# Patient Record
Sex: FEMALE | Race: WHITE | Employment: UNEMPLOYED | URBAN - METROPOLITAN AREA
[De-identification: names, ages, dates, MRNs, and addresses within clinical notes are randomized per-mention and may not be internally consistent; named-entity substitution may affect disease eponyms.]

---

## 2017-06-02 ENCOUNTER — TELEPHONE (OUTPATIENT)
Dept: INTERNAL MEDICINE CLINIC | Age: 57
End: 2017-06-02

## 2017-06-02 ENCOUNTER — OFFICE VISIT (OUTPATIENT)
Dept: INTERNAL MEDICINE CLINIC | Age: 57
End: 2017-06-02

## 2017-06-02 VITALS
OXYGEN SATURATION: 97 % | RESPIRATION RATE: 20 BRPM | DIASTOLIC BLOOD PRESSURE: 59 MMHG | SYSTOLIC BLOOD PRESSURE: 99 MMHG | WEIGHT: 132 LBS | HEIGHT: 64 IN | HEART RATE: 63 BPM | TEMPERATURE: 98.3 F | BODY MASS INDEX: 22.53 KG/M2

## 2017-06-02 DIAGNOSIS — R51.9 INTERMITTENT HEADACHE: Primary | ICD-10-CM

## 2017-06-02 NOTE — MR AVS SNAPSHOT
Visit Information Date & Time Provider Department Dept. Phone Encounter #  
 6/2/2017  1:40 PM Nehemiah Anderson PA-C [facility] [phone] [id number] Your Appointments 6/5/2017  1:00 PM  
ACUTE CARE with Shreyas Tarango MD  
[facility]) Appt Note: Persistent, stabbing headache L side of head for 3 days [place] [address] [facility] [place] [address] Upcoming Health Maintenance Date Due INFLUENZA AGE 9 TO ADULT 8/1/2017 COLONOSCOPY 1/1/2018 PAP AKA CERVICAL CYTOLOGY 10/19/2018 BREAST CANCER SCRN MAMMOGRAM 11/3/2018 DTaP/Tdap/Td series (2 - Td) 8/24/2020 Allergies as of 6/2/2017  Review Complete On: 6/2/2017 By: Micheal Dash LPN Severity Noted Reaction Type Reactions Codeine  06/20/2011   Intolerance Itching Current Immunizations  Never Reviewed Name Date Influenza Vaccine 10/16/2014 Influenza Vaccine (Quad) PF 10/11/2016 Tdap 8/24/2010 Not reviewed this visit Vitals BP Pulse Temp Resp Height(growth percentile) Weight(growth percentile) 99/59 (BP 1 Location: Left arm, BP Patient Position: Sitting) 63 98.3 °F (36.8 °C) (Oral) 20 5' 4\" (1.626 m) 132 lb (59.9 kg) SpO2 BMI OB Status Smoking Status 97% 22.66 kg/m2 Ablation Never Smoker Vitals History BMI and BSA Data Body Mass Index Body Surface Area  
 22.66 kg/m 2 1.64 m 2 Preferred Pharmacy Pharmacy Name Phone [address] [address] [phone] Your Updated Medication List  
  
   
This list is accurate as of: 6/2/17  2:38 PM.  Always use your most recent med list.  
  
  
  
  
 CALCIUM+D 500 mg(1,250mg) -200 unit per tablet Generic drug:  calcium-vitamin D Take 1 Tab by mouth two (2) times daily (with meals). valACYclovir 500 mg tablet Commonly known as:  VALTREX Take 1 Tab by mouth two (2) times a day. For 5 days prn outbreak Introducing hospitals & HEALTH SERVICES! Dear Tyler: 
Thank you for requesting a [employer] account. Our records indicate that you already have an active [employer] account. You can access your account anytime at [email or web address] Did you know that you can access your hospital and ER discharge instructions at any time in [employer]? You can also review all of your test results from your hospital stay or ER visit. Additional Information If you have questions, please visit the Frequently Asked Questions section of the [employer] website at [email or web address] Remember, [employer] is NOT to be used for urgent needs. For medical emergencies, dial 911. Now available from your iPhone and Android! Please provide this summary of care documentation to your next provider. Your primary care clinician is listed as YOSELIN DIAZ. If you have any questions after today's visit, please call [phone].

## 2017-06-02 NOTE — PROGRESS NOTES
Chief Complaint   Patient presents with    Headache    Visual Problems     Patient reports she had some floating sparkles in her visual field she went to her eye doctor for follow up. Eye doctor checked to make sure her retina was not detached. She remains  With HA'S and some nausea.

## 2017-06-03 NOTE — PROGRESS NOTES
HISTORY OF PRESENT ILLNESS  Ce Flores is a 61 y.o. female. HPI  Patient presents to the office for evaluation of stabbing head pain. She reports on May 20 th she started to see larger floaters and streaks of lights. It was around this time she also developed a sharp stabbing pain in the left side of her head. She reports the pain does not last very long but she does have some what of a residual discomfort. She went to see her eye doctor and he reports he could see the floater but told her the retinal was normal. He then had her to come back on the 27 th for recheck. Again she was told her floater was stable and retina normal. She has continued to have the pain. She has taken ASA, tylenol , and motrin with no relief. She does admit the pain does not seem to be coming as frequent. She can recall having a similar episode like this 10-11 years ago. She does not recall the diagnosis or the treatment. Review of Systems   Eyes: Negative for blurred vision, double vision, photophobia and pain. Gastrointestinal: Negative for nausea and vomiting. Some mild nausea due to the ASA   Neurological: Positive for headaches. Negative for dizziness and sensory change. Blood pressure 99/59, pulse 63, temperature 98.3 °F (36.8 °C), temperature source Oral, resp. rate 20, height 5' 4\" (1.626 m), weight 132 lb (59.9 kg), SpO2 97 %. Physical Exam   Constitutional: She appears well-developed and well-nourished. No distress. HENT:   No tenderness of the scalp. No rash noted. Eyes: Pupils are equal, round, and reactive to light. Neurological: She has normal reflexes. ASSESSMENT and PLAN  Wood Oneill was seen today for headache and visual problems. Diagnoses and all orders for this visit:    Intermittent headache    Dr. Samm Silverman was able to see the patient as well. The patient was told that her pain seemed to be a nerve pain of some kind.  She is gong to take mobic over the weekend and then report back to Dr. Ruff Freed on Monday if not feeling better. She will get a CT if pain is not better.

## 2017-10-23 ENCOUNTER — CLINICAL SUPPORT (OUTPATIENT)
Dept: INTERNAL MEDICINE CLINIC | Age: 57
End: 2017-10-23

## 2017-10-23 VITALS
TEMPERATURE: 97.7 F | DIASTOLIC BLOOD PRESSURE: 54 MMHG | SYSTOLIC BLOOD PRESSURE: 91 MMHG | OXYGEN SATURATION: 97 % | HEART RATE: 59 BPM | RESPIRATION RATE: 20 BRPM

## 2017-10-23 DIAGNOSIS — Z23 ENCOUNTER FOR IMMUNIZATION: Primary | ICD-10-CM

## 2018-02-13 ENCOUNTER — OFFICE VISIT (OUTPATIENT)
Dept: INTERNAL MEDICINE CLINIC | Age: 58
End: 2018-02-13

## 2018-02-13 VITALS
RESPIRATION RATE: 17 BRPM | SYSTOLIC BLOOD PRESSURE: 101 MMHG | TEMPERATURE: 97.5 F | DIASTOLIC BLOOD PRESSURE: 47 MMHG | HEART RATE: 57 BPM | WEIGHT: 134.8 LBS | HEIGHT: 64 IN | BODY MASS INDEX: 23.01 KG/M2 | OXYGEN SATURATION: 98 %

## 2018-02-13 DIAGNOSIS — Z12.39 SCREENING FOR BREAST CANCER: ICD-10-CM

## 2018-02-13 DIAGNOSIS — Z00.00 ROUTINE GENERAL MEDICAL EXAMINATION AT A HEALTH CARE FACILITY: Primary | ICD-10-CM

## 2018-02-13 RX ORDER — VALACYCLOVIR HYDROCHLORIDE 1 G/1
2000 TABLET, FILM COATED ORAL
COMMUNITY
Start: 2017-12-14 | End: 2022-02-08 | Stop reason: SDUPTHER

## 2018-02-13 RX ORDER — AZITHROMYCIN 250 MG/1
TABLET, FILM COATED ORAL
Qty: 6 TAB | Refills: 0 | Status: SHIPPED | OUTPATIENT
Start: 2018-02-13 | End: 2018-02-18

## 2018-02-13 NOTE — MR AVS SNAPSHOT
[address] [place] [address] [address] [place] [address] 
[phone] Patient: Freedom Marlow MRN: [id number] Visit Information Date & Time Provider Department Dept. Phone Encounter #  
 2/13/2018 10:40 AM Jese Durbin MD [facility] [phone] [id number] Upcoming Health Maintenance Date Due COLONOSCOPY 1/1/2018 PAP AKA CERVICAL CYTOLOGY 10/19/2018 BREAST CANCER SCRN MAMMOGRAM 11/3/2018 DTaP/Tdap/Td series (2 - Td) 8/24/2020 Allergies as of 2/13/2018  Review Complete On: 2/13/2018 By: Jese Durbin MD  
  
 Severity Noted Reaction Type Reactions Codeine  06/20/2011   Intolerance Itching Current Immunizations  Reviewed on 10/23/2017 Name Date Influenza Vaccine 10/16/2014 Influenza Vaccine (Quad) PF 10/23/2017, 10/11/2016 Tdap 8/24/2010 Not reviewed this visit You Were Diagnosed With   
  
 Codes Comments Routine general medical examination at a health care facility    -  Primary ICD-10-CM: Z00.00 ICD-9-CM: V70.0 Screening for breast cancer     ICD-10-CM: Z12.31 
ICD-9-CM: V76.10 Vitals BP Pulse Temp Resp Height(growth percentile) Weight(growth percentile) 101/47 (BP 1 Location: Left arm, BP Patient Position: Sitting) (!) 57 97.5 °F (36.4 °C) (Oral) 17 5' 4\" (1.626 m) 134 lb 12.8 oz (61.1 kg) SpO2 BMI OB Status Smoking Status 98% 23.14 kg/m2 Ablation Never Smoker Vitals History BMI and BSA Data Body Mass Index Body Surface Area  
 23.14 kg/m 2 1.66 m 2 Preferred Pharmacy Pharmacy Name Phone [facility]  [phone] Your Updated Medication List  
  
   
This list is accurate as of: 2/13/18 11:37 AM.  Always use your most recent med list.  
  
  
  
  
 azithromycin 250 mg tablet Commonly known as:  Marcella Galdamez Take as directed. CALCIUM+D 500 mg(1,250mg) -200 unit per tablet Generic drug:  calcium-vitamin D Take 1 Tab by mouth two (2) times daily (with meals). valACYclovir 1 gram tablet Commonly known as:  VALTREX Take 2,000 mg by mouth every twelve (12) hours. X 2 doses Prescriptions Sent to Pharmacy Refills  
 azithromycin (ZITHROMAX) 250 mg tablet 0 Sig: Take as directed. Class: Normal  
 Pharmacy: [address] [place] [address] 18, [address] #: [phone] We Performed the Following CBC W/O DIFF [01740 CPT(R)] LIPID PANEL [13915 CPT(R)] METABOLIC PANEL, COMPREHENSIVE [66267 CPT(R)] TSH 3RD GENERATION [83677 CPT(R)] VITAMIN D, 25 HYDROXY [id number] CPT(R)] To-Do List   
 02/20/2018 Imaging:  [facility] MAMMO BI SCREENING INCL CAD Patient Instructions Stop Calcium and Vitamin D.  Start Vitamin D3 1000 units daily. Introducing [facility] & HEALTH SERVICES! Dear Jasper Brown: 
Thank you for requesting a [employer] account. Our records indicate that you already have an active [employer] account. You can access your account anytime at [email or web address]"[email or web address]. [facility]/"[employer] Did you know that you can access your hospital and ER discharge instructions at any time in [employer]? You can also review all of your test results from your hospital stay or ER visit. Additional Information If you have questions, please visit the Frequently Asked Questions section of the [employer] website at [email or web address]"[email or web address]. [facility]/"[employer]/. Remember, [employer] is NOT to be used for urgent needs. For medical emergencies, dial 911. Now available from your iPhone and Android! Please provide this summary of care documentation to your next provider. Your primary care clinician is listed as YOSELIN DIAZ. If you have any questions after today's visit, please call [phone].

## 2018-02-13 NOTE — PROGRESS NOTES
Chief Complaint   Patient presents with    Complete Physical     1. Have you been to the ER, urgent care clinic since your last visit? Hospitalized since your last visit? No    2. Have you seen or consulted any other health care providers outside of the [address] [place] [address] since your last visit? Include any pap smears or colon screening.  No

## 2018-02-13 NOTE — PROGRESS NOTES
Subjective:   60 y.o. female for Well Woman Check. Her gyne and breast care is done elsewhere by her Ob-Gyne physician. Patient Active Problem List    Diagnosis Date Noted    Aisha left 10/11/2016    Parotid neoplasm 08/29/2011     Current Outpatient Prescriptions   Medication Sig Dispense Refill    valACYclovir (VALTREX) 1 gram tablet Take 2,000 mg by mouth every twelve (12) hours. X 2 doses      calcium-vitamin D (CALCIUM+D) 500 mg(1,250mg) -200 unit per tablet Take 1 Tab by mouth two (2) times daily (with meals). Allergies   Allergen Reactions    Codeine Itching     Past Medical History:   Diagnosis Date    Arthritis     Nausea & vomiting      Past Surgical History:   Procedure Laterality Date    BREAST SURGERY PROCEDURE UNLISTED  1996    IMPLANTS    ENDOSCOPY, COLON, DIAGNOSTIC  5/2009    Eleanor Young)    HX DILATION AND CURETTAGE  1993    HX GYN  2007    ABLATION (+bladder sling)    HX HEENT  1993    SINUS    HX HEENT  2005    LASIK    HX ORTHOPAEDIC  2004    R HIP    IMPLANT BREAST SILICONE/EQ  3351     Family History   Problem Relation Age of Onset    Cancer Mother     Arthritis-rheumatoid Mother     Lung Disease Mother      pulm fibrosis    Macular Degen Father     Lung Disease Maternal Uncle      pulm fibrosis    Lung Disease Maternal Grandmother      pulm fibrosis     Social History   Substance Use Topics    Smoking status: Never Smoker    Smokeless tobacco: Never Used    Alcohol use 1.2 - 1.8 oz/week     2 - 3 Standard drinks or equivalent per week      Comment: OCCASIONAL         Specific concerns today:   Keeps receiving calls re colonoscopy. Had colonoscopy in 5/2009 with Dr. Moira Gifford. Has valtrex to take 2000mg q12h x 2 doses for cold sores. Recently had 1, took medication and not as bad. Hadn't had a cold sore in a while. 2 weeks ago started with headache and congestion. Increased cold symptoms.   Continues with congestion and cough  Congestion is colored in the am.  No fevers and chills. Initially taking Tylenol and Amelia Boulder cold and cough. Also with Netti pot at least daily. .    In 2013 participated in study at [facility] for pulmonary fibrosis ([facility] Mother, Lyndsey and Negro). Had PFTs, lab work, CT of lungs and lung biopsy. Just had f/u with time 1/2018 - had lab work, PFT's and lung CT repeated. Told no change    Review of Systems  Constitutional: negative  Eyes: negative except for benign floaters. UTD with Osvaldo Adan at [facility]. Ears, nose, mouth, throat, and face: negative except for decreased hearing on right post parotid surgery. Respiratory: negative except for cough with cold  Cardiovascular: negative  Gastrointestinal: negative  Genitourinary:negative  Integument/breast: negative  Hematologic/lymphatic: negative  Musculoskeletal:negative except for mild low back pain - improved by short term PT and home exercises. left sciatica  Neurological: negative  Behavioral/Psych: negative  Endocrine: negative  Allergic/Immunologic: negative    Objective:   Blood pressure 101/47, pulse (!) 57, temperature 97.5 °F (36.4 °C), temperature source Oral, resp. rate 17, height 5' 4\" (1.626 m), weight 134 lb 12.8 oz (61.1 kg), SpO2 98 %.    Physical Examination:   General appearance - alert, well appearing, and in no distress and oriented to person, place, and time  Mental status - alert, oriented to person, place, and time, normal mood, behavior, speech, dress, motor activity, and thought processes  Eyes - pupils equal and reactive, extraocular eye movements intact  Ears - bilateral external ear canals normal, TM fullness without erythema bilat  Nose - edematous with thick discharge,  Tenderness right frontal sinus  Mouth - mucous membranes moist, pharynx normal without lesions  Neck - supple, no significant adenopathy, carotids upstroke normal bilaterally, no bruits, thyroid exam: thyroid is normal in size without nodules or tenderness  Lymphatics - no palpable lymphadenopathy, no hepatosplenomegaly  Chest - clear to auscultation, no wheezes, rales or rhonchi, symmetric air entry  Heart - normal rate, regular rhythm, normal S1, S2, no murmurs, rubs, clicks or gallops  Abdomen - soft, nontender, nondistended, no masses or organomegaly  bowel sounds normal  Breasts - breasts appear normal, no suspicious masses, no skin or nipple changes or axillary nodes  Back exam - full range of motion, no tenderness, palpable spasm or pain on motion  Neurological - alert, oriented, normal speech, no focal findings or movement disorder noted  Musculoskeletal - no joint tenderness, deformity or swelling  Extremities - peripheral pulses normal, no pedal edema, no clubbing or cyanosis  Skin - normal coloration and turgor, no rashes, no suspicious skin lesions noted     Assessment/Plan:   61 yo female. - check labs, mammogram, will obtain record of last colonoscopy but looks like she is not due until 2019. Sinusitis - zpack, mucinex.     Orders Placed This Encounter    KIRSTEN MAMMO BI SCREENING INCL CAD    METABOLIC PANEL, COMPREHENSIVE    LIPID PANEL    CBC W/O DIFF    VITAMIN D, 25 HYDROXY    TSH 3RD GENERATION    valACYclovir (VALTREX) 1 gram tablet    azithromycin (ZITHROMAX) 250 mg tablet     Follow-up Disposition: Not on File

## 2018-02-14 LAB
25(OH)D3+25(OH)D2 SERPL-MCNC: 37.2 NG/ML (ref 30–100)
ALBUMIN SERPL-MCNC: 4.4 G/DL (ref 3.5–5.5)
ALBUMIN/GLOB SERPL: 1.6 {RATIO} (ref 1.2–2.2)
ALP SERPL-CCNC: 95 IU/L (ref 39–117)
ALT SERPL-CCNC: 16 IU/L (ref 0–32)
AST SERPL-CCNC: 23 IU/L (ref 0–40)
BILIRUB SERPL-MCNC: 0.3 MG/DL (ref 0–1.2)
BUN SERPL-MCNC: 19 MG/DL (ref 6–24)
BUN/CREAT SERPL: 32 (ref 9–23)
CALCIUM SERPL-MCNC: 9.6 MG/DL (ref 8.7–10.2)
CHLORIDE SERPL-SCNC: 98 MMOL/L (ref 96–106)
CHOLEST SERPL-MCNC: 201 MG/DL (ref 100–199)
CO2 SERPL-SCNC: 28 MMOL/L (ref 18–29)
CREAT SERPL-MCNC: 0.6 MG/DL (ref 0.57–1)
ERYTHROCYTE [DISTWIDTH] IN BLOOD BY AUTOMATED COUNT: 13.1 % (ref 12.3–15.4)
GFR SERPLBLD CREATININE-BSD FMLA CKD-EPI: 100 ML/MIN/1.73
GFR SERPLBLD CREATININE-BSD FMLA CKD-EPI: 115 ML/MIN/1.73
GLOBULIN SER CALC-MCNC: 2.7 G/DL (ref 1.5–4.5)
GLUCOSE SERPL-MCNC: 83 MG/DL (ref 65–99)
HCT VFR BLD AUTO: 40.8 % (ref 34–46.6)
HDLC SERPL-MCNC: 101 MG/DL
HGB BLD-MCNC: 13.2 G/DL (ref 11.1–15.9)
INTERPRETATION, 910389: NORMAL
LDLC SERPL CALC-MCNC: 88 MG/DL (ref 0–99)
MCH RBC QN AUTO: 31.7 PG (ref 26.6–33)
MCHC RBC AUTO-ENTMCNC: 32.4 G/DL (ref 31.5–35.7)
MCV RBC AUTO: 98 FL (ref 79–97)
PLATELET # BLD AUTO: 247 X10E3/UL (ref 150–379)
POTASSIUM SERPL-SCNC: 4.1 MMOL/L (ref 3.5–5.2)
PROT SERPL-MCNC: 7.1 G/DL (ref 6–8.5)
RBC # BLD AUTO: 4.16 X10E6/UL (ref 3.77–5.28)
SODIUM SERPL-SCNC: 142 MMOL/L (ref 134–144)
TRIGL SERPL-MCNC: 59 MG/DL (ref 0–149)
TSH SERPL DL<=0.005 MIU/L-ACNC: 1.31 UIU/ML (ref 0.45–4.5)
VLDLC SERPL CALC-MCNC: 12 MG/DL (ref 5–40)
WBC # BLD AUTO: 8.5 X10E3/UL (ref 3.4–10.8)

## 2018-02-16 ENCOUNTER — HOSPITAL ENCOUNTER (OUTPATIENT)
Dept: MAMMOGRAPHY | Age: 58
Discharge: HOME | End: 2018-02-16
Attending: INTERNAL MEDICINE
Payer: COMMERCIAL

## 2018-02-16 DIAGNOSIS — Z12.39 SCREENING FOR BREAST CANCER: ICD-10-CM

## 2018-02-16 PROCEDURE — 77067 SCR MAMMO BI INCL CAD: CPT

## 2018-10-19 ENCOUNTER — CLINICAL SUPPORT (OUTPATIENT)
Dept: INTERNAL MEDICINE CLINIC | Age: 58
End: 2018-10-19

## 2018-10-19 VITALS — TEMPERATURE: 98.1 F

## 2018-10-19 DIAGNOSIS — Z23 ENCOUNTER FOR IMMUNIZATION: Primary | ICD-10-CM

## 2018-12-04 ENCOUNTER — OFFICE VISIT (OUTPATIENT)
Dept: INTERNAL MEDICINE CLINIC | Age: 58
End: 2018-12-04

## 2018-12-04 VITALS
SYSTOLIC BLOOD PRESSURE: 99 MMHG | HEART RATE: 62 BPM | HEIGHT: 64 IN | DIASTOLIC BLOOD PRESSURE: 53 MMHG | OXYGEN SATURATION: 98 % | RESPIRATION RATE: 20 BRPM | TEMPERATURE: 98.1 F | WEIGHT: 134 LBS | BODY MASS INDEX: 22.88 KG/M2

## 2018-12-04 DIAGNOSIS — K05.6 SORE GUMS: Primary | ICD-10-CM

## 2018-12-04 DIAGNOSIS — J02.9 SORE THROAT: ICD-10-CM

## 2018-12-04 LAB
S PYO AG THROAT QL: NEGATIVE
VALID INTERNAL CONTROL?: YES

## 2018-12-04 RX ORDER — TRIAMCINOLONE ACETONIDE 1 MG/G
PASTE DENTAL 2 TIMES DAILY
Qty: 5 G | Refills: 0 | Status: SHIPPED | OUTPATIENT
Start: 2018-12-04 | End: 2019-02-22 | Stop reason: ALTCHOICE

## 2018-12-04 NOTE — PROGRESS NOTES
HISTORY OF PRESENT ILLNESS  Serena Gonzales is a 60 y.o. female. HPI  Patient presents to the office for evaluation of mouth pain. She reports she was diagnosed with Herpes simplex 1 about 3 years. She states the first outbreak was severe and she was treated initially with a longer course of valtrex. Since then she has episodes from time to time. For the past year however she has been symptoms ranging for soreness of gums to pain of her tongue. She is not sure if the pain is related to her diagnosis of oral herpes or not. She has been getting her teeth straighten with invisaline as well. She can recall when she first used the retainer it did make a tingling sensation in her mouth. Also she does believe it may run her gums at times. She states she has been using the same types of  on the retainers since the start of her using them. She does have a sore throat that worsen last night and states she has been having a headache off and on for some time. Review of Systems   HENT:        Gum and tongue pain. Blood pressure 99/53, pulse 62, temperature 98.1 °F (36.7 °C), temperature source Oral, resp. rate 20, height 5' 4\" (1.626 m), weight 134 lb (60.8 kg), SpO2 98 %. Physical Exam   HENT:   Mouth- mild irritation noted of the upper gum area  Mild erythema noted of the front aspect of the tip  No vesicles noted in mouth or on the lip. ASSESSMENT and PLAN  Diagnoses and all orders for this visit:    1. Sore gums  -     triamcinolone acetonide (KENALOG) 0.1 % dental paste; by Dental route two (2) times a day. -     AMB POC RAPID STREP A  -     CULTURE, STREP THROAT    2. Sore throat       I explained to the patient that I am not convinced that the soreness in her mouth is related to her history of cold sores. I believe the soreness is related to her retainer and I have ask then to reach out to them to see if previous patients have complained about this before.  I will speak with Dr. Channing Spatz about her symptoms and get her thoughts. Meanwhile she is going to try the kenalog to see if this will help. All this was discussed with the patient and she understands and agrees.

## 2018-12-06 LAB — S PYO THROAT QL CULT: NEGATIVE

## 2018-12-07 NOTE — PROGRESS NOTES
Writer contacted patient to inform of throat cx results per Mac Flash, patient verbalized understanding and informed writer she saw her orthodontist and he said the cleaning tabs do have a know allergen along with long term use the retainer is now toxic, a new retainer is being made and a alternative to cleaning may be in order, patient states she now has a cold so she may have had a couple of things going on.

## 2019-02-22 ENCOUNTER — OFFICE VISIT (OUTPATIENT)
Dept: INTERNAL MEDICINE CLINIC | Age: 59
End: 2019-02-22

## 2019-02-22 VITALS
BODY MASS INDEX: 23.32 KG/M2 | HEIGHT: 64 IN | HEART RATE: 47 BPM | TEMPERATURE: 98 F | WEIGHT: 136.6 LBS | OXYGEN SATURATION: 98 % | SYSTOLIC BLOOD PRESSURE: 95 MMHG | DIASTOLIC BLOOD PRESSURE: 51 MMHG

## 2019-02-22 DIAGNOSIS — Z78.0 POSTMENOPAUSAL: ICD-10-CM

## 2019-02-22 DIAGNOSIS — Z00.00 ROUTINE GENERAL MEDICAL EXAMINATION AT A HEALTH CARE FACILITY: Primary | ICD-10-CM

## 2019-02-22 DIAGNOSIS — Z23 ENCOUNTER FOR IMMUNIZATION: ICD-10-CM

## 2019-02-22 DIAGNOSIS — M85.89 OSTEOPENIA OF MULTIPLE SITES: ICD-10-CM

## 2019-02-22 NOTE — PROGRESS NOTES
Subjective:   60 y.o. female for Well Woman Check. Her gyne and breast care is done elsewhere by her Ob-Gyne physician. Had colonoscopy last Monday 2/18 with Dr Marylen Crofts. Found 1 tiny polyp  Has mammogram next month. Pap in fall. Patient Active Problem List    Diagnosis Date Noted    Bunion, left 10/11/2016    Parotid neoplasm 08/29/2011     Current Outpatient Medications   Medication Sig Dispense Refill    vit C/multivit-mins/calcium/D3 (EMERGEN-C VITAMIN D-CALCIUM PO) Take  by mouth.  valACYclovir (VALTREX) 1 gram tablet Take 2,000 mg by mouth every twelve (12) hours. X 2 doses       Allergies   Allergen Reactions    Codeine Itching     Past Medical History:   Diagnosis Date    Arthritis     Nausea & vomiting      Past Surgical History:   Procedure Laterality Date    BREAST SURGERY PROCEDURE UNLISTED  1996    IMPLANTS    ENDOSCOPY, COLON, DIAGNOSTIC  5/2009    Patrizia Levin)    HX DILATION AND CURETTAGE  1993    HX GYN  2007    ABLATION (+bladder sling)    HX HEENT  1993    SINUS    HX HEENT  2005    LASIK    HX ORTHOPAEDIC  2004    R HIP    IMPLANT BREAST SILICONE/EQ  3882     Family History   Problem Relation Age of Onset    Cancer Mother     Arthritis-rheumatoid Mother     Lung Disease Mother         pulm fibrosis    Macular Degen Father     Lung Disease Maternal Uncle         pulm fibrosis    Lung Disease Maternal Grandmother         pulm fibrosis     Social History     Tobacco Use    Smoking status: Never Smoker    Smokeless tobacco: Never Used   Substance Use Topics    Alcohol use: Yes     Alcohol/week: 1.2 - 1.8 oz     Types: 2 - 3 Standard drinks or equivalent per week     Comment: OCCASIONAL             Specific concerns today:   Saw FERNANDA Garber in December for ? Allergic reaction to her Invisalign vs . Changed , saw dentist who made her a new Invisalign out of different material.  Since this time mouth still doesn't feel right. Continues to itch.   She has seen her dentist who brought up burning mouth syndrome but she feels more irritated or itching. She also saw her dermatologist who did a patch test which was negative. When she tries to wear the Invisalign cant wear more than 2-3 hours without mouth irritation. Has an upcoming appointment with Dr. Can Wong whom her dermatologist referred her to re allergic reaction. She has a hx of oral HSV but has not had any blistering. Mother with similar issues - can only use 1 toothpaste. She has changed to a hypoallergenic toothpaste without improvement. Wonders about different flavorings. Weight is up a couple of lbs. Continues to exercise. Tries to eat well. Still likes sweets. Review of Systems  Constitutional: negative except for mild weight gain of about 2 lbs per year  Eyes: negative  Ears, nose, mouth, throat, and face: negative except for mouth irritation as above.  right ear congestion intermittently. Respiratory: negative except for cough with colds  Cardiovascular: negative  Gastrointestinal: negative  Genitourinary:negative except for mild urge incontinence  Integument/breast: negative  Hematologic/lymphatic: negative  Musculoskeletal:negative  Neurological: negative  Behavioral/Psych: negative  Endocrine: negative  Allergic/Immunologic: negative    Objective:   Blood pressure 95/51, pulse (!) 47, temperature 98 °F (36.7 °C), temperature source Oral, height 5' 4\" (1.626 m), weight 136 lb 9.6 oz (62 kg), SpO2 98 %.    Physical Examination:   General appearance - alert, well appearing, and in no distress and oriented to person, place, and time  Mental status - alert, oriented to person, place, and time, normal mood, behavior, speech, dress, motor activity, and thought processes  Eyes - pupils equal and reactive, extraocular eye movements intact  Ears - bilateral TM's and external ear canals normal  Nose - normal and patent, no erythema, discharge or polyps  Mouth - mucous membranes moist, pharynx normal without lesions  Neck - supple, no significant adenopathy, carotids upstroke normal bilaterally, no bruits, thyroid exam: thyroid is normal in size without nodules or tenderness  Lymphatics - no palpable lymphadenopathy, no hepatosplenomegaly  Chest - clear to auscultation, no wheezes, rales or rhonchi, symmetric air entry  Heart - normal rate, regular rhythm, normal S1, S2, no murmurs, rubs, clicks or gallops  Abdomen - soft, nontender, nondistended, no masses or organomegaly  bowel sounds normal  Breasts - breasts appear normal - bilat implants, no suspicious masses, no skin or nipple changes or axillary nodes  Back exam - full range of motion, no tenderness, palpable spasm or pain on motion  Neurological - alert, oriented, normal speech, no focal findings or movement disorder noted  Musculoskeletal - no joint tenderness, deformity or swelling  Extremities - peripheral pulses normal, no pedal edema, no clubbing or cyanosis  Skin - normal coloration and turgor, no rashes, no suspicious skin lesions noted     Assessment/Plan:   64yo female  Mouth irritation - f/u with Dr. Jamel Maciel for further patch testing. ? 1-2 months suppressive valacyclovir, ? Burning mouth. Osteopenia on bone density 2015. Will repeat  Hm - TDAP, upcoming mammogram.   Check labs.     call if any problems  Orders Placed This Encounter    DEXA BONE DENSITY STUDY AXIAL    TETANUS, DIPHTHERIA TOXOIDS AND ACELLULAR PERTUSSIS VACCINE (TDAP), IN INDIVIDS. >=7, IM    METABOLIC PANEL, COMPREHENSIVE    LIPID PANEL    CBC W/O DIFF    VITAMIN D, 25 HYDROXY    TSH 3RD GENERATION    vit C/multivit-mins/calcium/D3 (EMERGEN-C VITAMIN D-CALCIUM PO)    varicella-zoster recombinant, PF, (SHINGRIX, PF,) 50 mcg/0.5 mL susr injection     Follow-up Disposition: Not on File

## 2019-02-22 NOTE — PROGRESS NOTES
Visit Vitals  BP 95/51   Pulse (!) 47   Temp 98 °F (36.7 °C) (Oral)   Ht 5' 4\" (1.626 m)   Wt 136 lb 9.6 oz (62 kg)   SpO2 98%   BMI 23.45 kg/m²     1. Have you been to the ER, urgent care clinic since your last visit? Hospitalized since your last visit? no    2. Have you seen or consulted any other health care providers outside of the [address] [place] [address] since your last visit? Include any pap smears or colon screening.  no

## 2019-02-23 LAB
25(OH)D3+25(OH)D2 SERPL-MCNC: 36.2 NG/ML (ref 30–100)
ALBUMIN SERPL-MCNC: 4.5 G/DL (ref 3.6–4.8)
ALBUMIN/GLOB SERPL: 1.6 {RATIO} (ref 1.2–2.2)
ALP SERPL-CCNC: 87 IU/L (ref 39–117)
ALT SERPL-CCNC: 20 IU/L (ref 0–32)
AST SERPL-CCNC: 25 IU/L (ref 0–40)
BILIRUB SERPL-MCNC: 0.3 MG/DL (ref 0–1.2)
BUN SERPL-MCNC: 17 MG/DL (ref 8–27)
BUN/CREAT SERPL: 22 (ref 12–28)
CALCIUM SERPL-MCNC: 9.3 MG/DL (ref 8.7–10.3)
CHLORIDE SERPL-SCNC: 102 MMOL/L (ref 96–106)
CHOLEST SERPL-MCNC: 218 MG/DL (ref 100–199)
CO2 SERPL-SCNC: 22 MMOL/L (ref 20–29)
CREAT SERPL-MCNC: 0.79 MG/DL (ref 0.57–1)
ERYTHROCYTE [DISTWIDTH] IN BLOOD BY AUTOMATED COUNT: 13.1 % (ref 12.3–15.4)
GLOBULIN SER CALC-MCNC: 2.9 G/DL (ref 1.5–4.5)
GLUCOSE SERPL-MCNC: 95 MG/DL (ref 65–99)
HCT VFR BLD AUTO: 41.6 % (ref 34–46.6)
HDLC SERPL-MCNC: 99 MG/DL
HGB BLD-MCNC: 13.4 G/DL (ref 11.1–15.9)
INTERPRETATION, 910389: NORMAL
LDLC SERPL CALC-MCNC: 105 MG/DL (ref 0–99)
MCH RBC QN AUTO: 32 PG (ref 26.6–33)
MCHC RBC AUTO-ENTMCNC: 32.2 G/DL (ref 31.5–35.7)
MCV RBC AUTO: 99 FL (ref 79–97)
PLATELET # BLD AUTO: 206 X10E3/UL (ref 150–379)
POTASSIUM SERPL-SCNC: 4.5 MMOL/L (ref 3.5–5.2)
PROT SERPL-MCNC: 7.4 G/DL (ref 6–8.5)
RBC # BLD AUTO: 4.19 X10E6/UL (ref 3.77–5.28)
SODIUM SERPL-SCNC: 142 MMOL/L (ref 134–144)
TRIGL SERPL-MCNC: 68 MG/DL (ref 0–149)
TSH SERPL DL<=0.005 MIU/L-ACNC: 1.9 UIU/ML (ref 0.45–4.5)
VLDLC SERPL CALC-MCNC: 14 MG/DL (ref 5–40)
WBC # BLD AUTO: 4.6 X10E3/UL (ref 3.4–10.8)

## 2019-03-12 ENCOUNTER — HOSPITAL ENCOUNTER (OUTPATIENT)
Dept: MAMMOGRAPHY | Age: 59
Discharge: HOME | End: 2019-03-12
Attending: INTERNAL MEDICINE
Payer: COMMERCIAL

## 2019-03-12 DIAGNOSIS — Z78.0 POSTMENOPAUSAL: ICD-10-CM

## 2019-03-12 DIAGNOSIS — Z12.31 VISIT FOR SCREENING MAMMOGRAM: ICD-10-CM

## 2019-03-12 DIAGNOSIS — M85.89 OSTEOPENIA OF MULTIPLE SITES: ICD-10-CM

## 2019-03-12 PROCEDURE — 77080 DXA BONE DENSITY AXIAL: CPT

## 2019-03-12 PROCEDURE — 77067 SCR MAMMO BI INCL CAD: CPT

## 2020-02-24 NOTE — PROGRESS NOTES
Subjective:   63 y.o. female for Well Woman Check. Her gyne and breast care is done elsewhere by her Ob-Gyne physician. Patient Active Problem List    Diagnosis Date Noted    Vitreous floaters of both eyes 01/15/2018    Blepharitis 05/01/2017    Open angle with borderline findings and low glaucoma risk in both eyes 05/01/2017    Tear film insufficiency 05/01/2017    Bunion, left 10/11/2016    Parotid neoplasm 08/29/2011     Current Outpatient Medications   Medication Sig Dispense Refill    OTHER Oil of Oregano daily      vit C/multivit-mins/calcium/D3 (EMERGEN-C VITAMIN D-CALCIUM PO) Take  by mouth.  valACYclovir (VALTREX) 1 gram tablet Take 2,000 mg by mouth every twelve (12) hours. X 2 doses       Allergies   Allergen Reactions    Codeine Itching     Past Medical History:   Diagnosis Date    Arthritis     Nausea & vomiting      Past Surgical History:   Procedure Laterality Date    BREAST SURGERY PROCEDURE UNLISTED  1996    IMPLANTS    ENDOSCOPY, COLON, DIAGNOSTIC  5/2009    Shashank Santoyo)    HX DILATION AND CURETTAGE  1993    HX GYN  2007    ABLATION (+bladder sling)    HX HEENT  1993    SINUS    HX HEENT  2005    LASIK    HX ORTHOPAEDIC  2004    R HIP    IMPLANT BREAST SILICONE/EQ  3939     Family History   Problem Relation Age of Onset    Cancer Mother     Arthritis-rheumatoid Mother     Lung Disease Mother         pulm fibrosis    Macular Degen Father     Lung Disease Maternal Uncle         pulm fibrosis    Lung Disease Maternal Grandmother         pulm fibrosis     Social History     Tobacco Use    Smoking status: Never Smoker    Smokeless tobacco: Never Used   Substance Use Topics    Alcohol use: Yes     Alcohol/week: 2.0 - 3.0 standard drinks     Types: 2 - 3 Standard drinks or equivalent per week     Comment: OCCASIONAL         Specific concerns today:   Slight headache - feels could be caused by changing from reading glasses to no glasses.   Discussed with ophthalmologist at annual appointment in January. Felt may have needed to increase her strength of reading glasses. Dull - forehead and around eyes. Has not taken medication for the headache. At the beginning felt sinus but has resolved. No rhinorrhea, sore throat. No neck pain. No fevers or chills. .    Review of Systems  Constitutional: negative  Eyes: negative  Ears, nose, mouth, throat, and face: negative except for right ear intermittent fullness ever since parotid surgery. Treats with Mucinex  Respiratory: negative  Cardiovascular: negative  Gastrointestinal: negative except for mild constipation controlled with diet and water intake  Genitourinary:negative except for mild urgency incontinence. Integument/breast: negative  Hematologic/lymphatic: negative  Musculoskeletal:negative  Neurological: negative except for dizziness  Behavioral/Psych: negative  Endocrine: negative  Allergic/Immunologic: negative    Objective:   Blood pressure 90/57, pulse (!) 52, temperature 98.5 °F (36.9 °C), temperature source Oral, height 5' 4\" (1.626 m), weight 134 lb (60.8 kg), SpO2 98 %.    Physical Examination:   General appearance - alert, well appearing, and in no distress and oriented to person, place, and time  Mental status - alert, oriented to person, place, and time, normal mood, behavior, speech, dress, motor activity, and thought processes  Eyes - pupils equal and reactive, extraocular eye movements intact  Ears - bilateral TM's and external ear canals normal  Nose - normal and patent, no erythema, discharge or polyps  Mouth - mucous membranes moist, pharynx normal without lesions  Neck - supple, no significant adenopathy, carotids upstroke normal bilaterally, no bruits, thyroid exam: thyroid is normal in size without nodules or tenderness  Lymphatics - no palpable lymphadenopathy, no hepatosplenomegaly  Chest - clear to auscultation, no wheezes, rales or rhonchi, symmetric air entry  Heart - normal rate, regular rhythm, normal S1, S2, no murmurs, rubs, clicks or gallops  Abdomen - soft, nontender, nondistended, no masses or organomegaly  bowel sounds normal  Breasts - breasts appear normal, no suspicious masses, no skin or nipple changes or axillary nodes. Bilat implants. Back exam - full range of motion, no tenderness, palpable spasm or pain on motion  Neurological - alert, oriented, normal speech, no focal findings or movement disorder noted  Musculoskeletal - no joint tenderness, deformity or swelling  Extremities - peripheral pulses normal, no pedal edema, no clubbing or cyanosis  Skin - normal coloration and turgor, no rashes, no suspicious skin lesions noted     Assessment/Plan:   60yo female here for PE   - check labs.   UTD flu and shingrix  call if any problems  Orders Placed This Encounter    METABOLIC PANEL, COMPREHENSIVE    LIPID PANEL    CBC W/O DIFF    TSH 3RD GENERATION    VITAMIN D, 25 HYDROXY    OTHER

## 2020-02-25 ENCOUNTER — OFFICE VISIT (OUTPATIENT)
Dept: INTERNAL MEDICINE CLINIC | Age: 60
End: 2020-02-25

## 2020-02-25 VITALS
OXYGEN SATURATION: 98 % | TEMPERATURE: 98.5 F | HEART RATE: 52 BPM | HEIGHT: 64 IN | BODY MASS INDEX: 22.88 KG/M2 | WEIGHT: 134 LBS | DIASTOLIC BLOOD PRESSURE: 57 MMHG | SYSTOLIC BLOOD PRESSURE: 90 MMHG

## 2020-02-25 DIAGNOSIS — Z00.00 ROUTINE GENERAL MEDICAL EXAMINATION AT A HEALTH CARE FACILITY: Primary | ICD-10-CM

## 2020-02-25 DIAGNOSIS — E55.9 VITAMIN D DEFICIENCY: ICD-10-CM

## 2020-02-25 PROBLEM — H04.129 TEAR FILM INSUFFICIENCY: Status: ACTIVE | Noted: 2017-05-01

## 2020-02-25 PROBLEM — H01.009 BLEPHARITIS: Status: ACTIVE | Noted: 2017-05-01

## 2020-02-25 PROBLEM — H40.013 OPEN ANGLE WITH BORDERLINE FINDINGS AND LOW GLAUCOMA RISK IN BOTH EYES: Status: ACTIVE | Noted: 2017-05-01

## 2020-02-25 PROBLEM — H43.393 VITREOUS FLOATERS OF BOTH EYES: Status: ACTIVE | Noted: 2018-01-15

## 2020-02-25 NOTE — PROGRESS NOTES
Chief Complaint   Patient presents with    Physical     Visit Vitals  BP 90/57   Pulse (!) 52   Temp 98.5 °F (36.9 °C) (Oral)   Ht 5' 4\" (1.626 m)   Wt 134 lb (60.8 kg)   SpO2 98%   BMI 23.00 kg/m²     1. Have you been to the ER, urgent care clinic since your last visit? Hospitalized since your last visit? no    2. Have you seen or consulted any other health care providers outside of the [address] [place] [address] since your last visit? Include any pap smears or colon screening.  Yes, Dermatologist

## 2020-02-28 LAB
25(OH)D3+25(OH)D2 SERPL-MCNC: 39.4 NG/ML (ref 30–100)
ALBUMIN SERPL-MCNC: 4.3 G/DL (ref 3.8–4.8)
ALBUMIN/GLOB SERPL: 1.6 {RATIO} (ref 1.2–2.2)
ALP SERPL-CCNC: 79 IU/L (ref 39–117)
ALT SERPL-CCNC: 20 IU/L (ref 0–32)
AST SERPL-CCNC: 25 IU/L (ref 0–40)
BILIRUB SERPL-MCNC: 0.4 MG/DL (ref 0–1.2)
BUN SERPL-MCNC: 18 MG/DL (ref 8–27)
BUN/CREAT SERPL: 25 (ref 12–28)
CALCIUM SERPL-MCNC: 9.4 MG/DL (ref 8.7–10.3)
CHLORIDE SERPL-SCNC: 99 MMOL/L (ref 96–106)
CHOLEST SERPL-MCNC: 226 MG/DL (ref 100–199)
CO2 SERPL-SCNC: 23 MMOL/L (ref 20–29)
CREAT SERPL-MCNC: 0.71 MG/DL (ref 0.57–1)
ERYTHROCYTE [DISTWIDTH] IN BLOOD BY AUTOMATED COUNT: 11.8 % (ref 11.7–15.4)
GLOBULIN SER CALC-MCNC: 2.7 G/DL (ref 1.5–4.5)
GLUCOSE SERPL-MCNC: 88 MG/DL (ref 65–99)
HCT VFR BLD AUTO: 39.7 % (ref 34–46.6)
HDLC SERPL-MCNC: 104 MG/DL
HGB BLD-MCNC: 13.3 G/DL (ref 11.1–15.9)
INTERPRETATION, 910389: NORMAL
LDLC SERPL CALC-MCNC: 111 MG/DL (ref 0–99)
MCH RBC QN AUTO: 33.1 PG (ref 26.6–33)
MCHC RBC AUTO-ENTMCNC: 33.5 G/DL (ref 31.5–35.7)
MCV RBC AUTO: 99 FL (ref 79–97)
POTASSIUM SERPL-SCNC: 4.5 MMOL/L (ref 3.5–5.2)
PROT SERPL-MCNC: 7 G/DL (ref 6–8.5)
RBC # BLD AUTO: 4.02 X10E6/UL (ref 3.77–5.28)
SODIUM SERPL-SCNC: 138 MMOL/L (ref 134–144)
TRIGL SERPL-MCNC: 57 MG/DL (ref 0–149)
TSH SERPL DL<=0.005 MIU/L-ACNC: 2.11 UIU/ML (ref 0.45–4.5)
VLDLC SERPL CALC-MCNC: 11 MG/DL (ref 5–40)
WBC # BLD AUTO: 5.9 X10E3/UL (ref 3.4–10.8)

## 2020-05-15 ENCOUNTER — VIRTUAL VISIT (OUTPATIENT)
Dept: INTERNAL MEDICINE CLINIC | Age: 60
End: 2020-05-15

## 2020-05-15 VITALS — TEMPERATURE: 96.5 F | BODY MASS INDEX: 22.88 KG/M2 | WEIGHT: 134 LBS | HEIGHT: 64 IN

## 2020-05-15 DIAGNOSIS — H81.11 BENIGN PAROXYSMAL POSITIONAL VERTIGO OF RIGHT EAR: Primary | ICD-10-CM

## 2020-05-15 RX ORDER — MECLIZINE HYDROCHLORIDE 25 MG/1
25 TABLET ORAL
Qty: 21 TAB | Refills: 0 | Status: SHIPPED | OUTPATIENT
Start: 2020-05-15 | End: 2020-05-25

## 2020-05-15 NOTE — PROGRESS NOTES
Rosa Elena Seaman is a 63 y.o. female who was seen by synchronous (real-time) audio-video technology on 5/15/2020. Consent: Rosa Elena Seaman, who was seen by synchronous (real-time) audio-video technology, and/or her healthcare decision maker, is aware that this patient-initiated, Telehealth encounter on 5/15/2020 is a billable service, with coverage as determined by her insurance carrier. She is aware that she may receive a bill and has provided verbal consent to proceed: Yes. Assessment & Plan:   Diagnoses and all orders for this visit:    1. Benign paroxysmal positional vertigo of right ear  -     meclizine (ANTIVERT) 25 mg tablet; Take 1 Tab by mouth three (3) times daily as needed for Dizziness for up to 10 days. I explained to the patient that it sounds like she is having vertigo. I had her to do a number of things today doing her visit and she does not appear to have any deficits. I have sent to the pharmacy some meclizine that she may take as needed. I have asked her to contact either myself or Dr. Laura Spaulding on Monday if her symptoms are persisting. She may need to be seen by her ear nose and throat doctor Dr. Tori Fajardo. I spent at least 23 minutes on this visit with this established patient. [phone]  Subjective:   Rosa Elena Seaman is a 63 y.o. female who was seen for Dizziness; Nausea; and Ear Pain    Patient presents for evaluation of dizziness that started Monday afternoon. Patient reports that Monday afternoon she started noticed that she was a little off balance. Tuesday she reports she did not get out of bed because when she open her eyes she felt as though the room was spinning. She reports during this time she felt very nauseated. Wednesday she reports she was feeling a little better and Thursday she actually did some things around the house.   Today she reports that although she is much better than she was she is not back to normal and she reports she has been having some head fogginess. She also reports that she has been having intermittent pain of her right ear. She reports that she has a history of surgery to her parotid gland on the right side in the past.  She reports that after the surgery she developed problems with her eustachian tube. She reports the pain of her right ear is not persistent and seems to come and go along with some popping of her ear. She reports in the past when she would come down with a cold she would get similar symptoms of eustachian tube dysfunction. She was told by the ear nose and throat doctor in the past to take Mucinex. She reports she does have some mild allergies but currently she does not feel that she is congested. The patient denies any weakness of her extremities. She denies visual changes, chest pain, or changes in her speech. She reports she is a little fatigued but believes this is due from being in bed on Tuesday and not doing much. Prior to Admission medications    Medication Sig Start Date End Date Taking? Authorizing Provider   meclizine (ANTIVERT) 25 mg tablet Take 1 Tab by mouth three (3) times daily as needed for Dizziness for up to 10 days. 5/15/20 5/25/20 Yes Ramona Omalley PA-C   OTHER Oil of Oregano daily   Yes Provider, Historical   vit C/multivit-mins/calcium/D3 (EMERGEN-C VITAMIN D-CALCIUM PO) Take  by mouth. Yes Provider, Historical   valACYclovir (VALTREX) 1 gram tablet Take 2,000 mg by mouth every eight to twelve (8-12) hours as needed.  X 2 doses 12/14/17  Yes Provider, Historical     Allergies   Allergen Reactions    Codeine Itching       Patient Active Problem List    Diagnosis Date Noted    Vitreous floaters of both eyes 01/15/2018    Blepharitis 05/01/2017    Open angle with borderline findings and low glaucoma risk in both eyes 05/01/2017    Tear film insufficiency 05/01/2017    Bunion, left 10/11/2016    Parotid neoplasm 08/29/2011     Current Outpatient Medications   Medication Sig Dispense Refill    meclizine (ANTIVERT) 25 mg tablet Take 1 Tab by mouth three (3) times daily as needed for Dizziness for up to 10 days. 21 Tab 0    OTHER Oil of Oregano daily      vit C/multivit-mins/calcium/D3 (EMERGEN-C VITAMIN D-CALCIUM PO) Take  by mouth.  valACYclovir (VALTREX) 1 gram tablet Take 2,000 mg by mouth every eight to twelve (8-12) hours as needed. X 2 doses       Allergies   Allergen Reactions    Codeine Itching     Past Medical History:   Diagnosis Date    Arthritis     Nausea & vomiting        ROS  See above    Objective:     Visit Vitals  Temp 96.5 °F (35.8 °C) (Oral)   Ht 5' 4\" (1.626 m)   Wt 134 lb (60.8 kg)   BMI 23.00 kg/m²      General: alert, cooperative, no distress   Mental  status: normal mood, behavior, speech, dress, motor activity, and thought processes, able to follow commands   HENT: NCAT   Neck: no visualized mass   Resp: no respiratory distress   Neuro: no gross deficits   Skin: no discoloration or lesions of concern on visible areas   Psychiatric: normal affect, consistent with stated mood, no evidence of hallucinations     Additional exam findings: We discussed the expected course, resolution and complications of the diagnosis(es) in detail. Medication risks, benefits, costs, interactions, and alternatives were discussed as indicated. I advised her to contact the office if her condition worsens, changes or fails to improve as anticipated. She expressed understanding with the diagnosis(es) and plan. Juliette Sal is a 63 y.o. female who was evaluated by a video visit encounter for concerns as above. Patient identification was verified prior to start of the visit. A caregiver was present when appropriate. Due to this being a TeleHealth encounter (During YCXPK-06 public health emergency), evaluation of the following organ systems was limited: Vitals/Constitutional/EENT/Resp/CV/GI//MS/Neuro/Skin/Heme-Lymph-Imm.   Pursuant to the emergency declaration under the [facility], 5369 waiver authority and the [employer] Act, this Virtual  Visit was conducted, with patient's (and/or legal guardian's) consent, to reduce the patient's risk of exposure to COVID-19 and provide necessary medical care. Services were provided through a video synchronous discussion virtually to substitute for in-person clinic visit. Patient and provider were located at their individual homes.       Scar Omalley PA-C

## 2020-05-15 NOTE — PROGRESS NOTES
Patient c/o dizziness and nausea x4 days. Patient losing balance. Right ear intermittant pain, itching or shot of pain, blowing nose hears popping.

## 2020-05-18 ENCOUNTER — TELEPHONE (OUTPATIENT)
Dept: FAMILY MEDICINE CLINIC | Age: 60
End: 2020-05-18

## 2020-05-18 ENCOUNTER — OFFICE VISIT (OUTPATIENT)
Dept: FAMILY MEDICINE CLINIC | Age: 60
End: 2020-05-18

## 2020-05-18 ENCOUNTER — TELEPHONE (OUTPATIENT)
Dept: INTERNAL MEDICINE CLINIC | Age: 60
End: 2020-05-18

## 2020-05-18 VITALS
RESPIRATION RATE: 16 BRPM | TEMPERATURE: 98.3 F | HEART RATE: 60 BPM | HEIGHT: 64 IN | BODY MASS INDEX: 23.12 KG/M2 | OXYGEN SATURATION: 98 % | WEIGHT: 135.4 LBS | SYSTOLIC BLOOD PRESSURE: 98 MMHG | DIASTOLIC BLOOD PRESSURE: 64 MMHG

## 2020-05-18 DIAGNOSIS — R42 VERTIGO: Primary | ICD-10-CM

## 2020-05-18 DIAGNOSIS — H69.83 EUSTACHIAN TUBE DYSFUNCTION, BILATERAL: ICD-10-CM

## 2020-05-18 DIAGNOSIS — H65.193 ACUTE MEE (MIDDLE EAR EFFUSION), BILATERAL: ICD-10-CM

## 2020-05-18 RX ORDER — AMOXICILLIN 500 MG/1
500 CAPSULE ORAL 2 TIMES DAILY
Qty: 14 CAP | Refills: 0 | Status: SHIPPED | OUTPATIENT
Start: 2020-05-18 | End: 2020-05-25

## 2020-05-18 NOTE — PROGRESS NOTES
Rosa Elena Seaman is a 63 y.o. female    Chief Complaint   Patient presents with    Follow-up     Patient is coming in because she started with nausea and dizziness about a week ago. NP stated that if she did not do better with medication to call back and patient states she stopped medication because it was not helping She stated that her daughter had mentioned she could have vertigo. Patient states she has had right ear pain of 1 in the last 24 hrs. 1. Have you been to the ER, urgent care clinic since your last visit? Hospitalized since your last visit? No  M  2. Have you seen or consulted any other health care providers outside of the [address] [place] [address] since your last visit? Include any pap smears or colon screening. No      Visit Vitals  BP 98/64 (BP 1 Location: Left arm, BP Patient Position: Sitting)   Pulse 60   Temp 98.3 °F (36.8 °C) (Oral)   Resp 16   Ht 5' 4\" (1.626 m)   Wt 135 lb 6.4 oz (61.4 kg)   SpO2 98%   BMI 23.24 kg/m²           Health Maintenance Due   Topic Date Due    Shingrix Vaccine Age 49> (2 of 2) 04/19/2019         Medication Reconciliation completed, changes noted.   Please  Update medication list.

## 2020-05-18 NOTE — PATIENT INSTRUCTIONS
- Zyrtec-D (ciritizine + pseudoephedrine) for 3-5 days    - Flonase (fluticasone) for eustachian tube dysfunction and fluid in ears    - Amoxicillin ( antibiotic) was sent to your pharmacy. Do not start until you discuss with doctor at follow up call. Middle Ear Fluid: Care Instructions  Your Care Instructions    Fluid often builds up inside the ear during a cold or allergies. Usually the fluid drains away, but sometimes a small tube in the ear, called the eustachian tube, stays blocked for months. Symptoms of fluid buildup may include:  · Popping, ringing, or a feeling of fullness or pressure in the ear. · Trouble hearing. · Balance problems and dizziness. In most cases, you can treat yourself at home. Follow-up care is a key part of your treatment and safety. Be sure to make and go to all appointments, and call your doctor if you are having problems. It's also a good idea to know your test results and keep a list of the medicines you take. How can you care for yourself at home? · In most cases, the fluid clears up within a few months without treatment. You may need more tests if the fluid does not clear up after 3 months. · If your doctor prescribed antibiotics, take them as directed. Do not stop taking them just because you feel better. You need to take the full course of antibiotics. When should you call for help? Call your doctor now or seek immediate medical care if:    · You have symptoms of infection, such as:  ? Increased pain, swelling, warmth, or redness. ? Pus draining from the area. ? A fever.    Watch closely for changes in your health, and be sure to contact your doctor if:    · You notice changes in hearing.     · You do not get better as expected. Where can you learn more? Go to http://yamilet-constantin.info/  Enter I737 in the search box to learn more about \"Middle Ear Fluid: Care Instructions. \"  Current as of: July 28, 2019Content Version: 12.4  © 1394-4010 Healthwise, [employer]. Care instructions adapted under license by [employer] (which disclaims liability or warranty for this information). If you have questions about a medical condition or this instruction, always ask your healthcare professional. [address] any warranty or liability for your use of this information. Vertigo: Exercises  Introduction  Here are some examples of exercises for you to try. The exercises may be suggested for a condition or for rehabilitation. Start each exercise slowly. Ease off the exercises if you start to have pain. You will be told when to start these exercises and which ones will work best for you. How to do the exercises  Exercise 1   1. Stand with a chair in front of you and a wall behind you. If you begin to fall, you may use them for support. 2. Stand with your feet together and your arms at your sides. 3. Move your head up and down 10 times. Exercise 2   1. Move your head side to side 10 times. Exercise 3   1. Move your head diagonally up and down 10 times. Exercise 4   1. Move your head diagonally up and down 10 times on the other side. Follow-up care is a key part of your treatment and safety. Be sure to make and go to all appointments, and call your doctor if you are having problems. It's also a good idea to know your test results and keep a list of the medicines you take. Where can you learn more? Go to http://yamilet-constantin.info/  Enter F349 in the search box to learn more about \"Vertigo: Exercises. \"  Current as of: July 28, 2019Content Version: 12.4  © 5543-3387 [employer]. Care instructions adapted under license by [employer] (which disclaims liability or warranty for this information).  If you have questions about a medical condition or this instruction, always ask your healthcare professional. [address] any warranty or liability for your use of this information. Vertigo: Care Instructions  Your Care Instructions    Vertigo is the feeling that you or your surroundings are moving when there is no actual movement. It is often described as a feeling of spinning, whirling, falling, or tilting. Vertigo may make you vomit or feel nauseated. You may have trouble standing or walking and may lose your balance. Vertigo is often related to an inner ear problem, but it can have other more serious causes. If vertigo continues, you may need more tests to find its cause. Follow-up care is a key part of your treatment and safety. Be sure to make and go to all appointments, and call your doctor if you are having problems. It's also a good idea to know your test results and keep a list of the medicines you take. How can you care for yourself at home? · Do not lie flat on your back. Prop yourself up slightly. This may reduce the spinning feeling. Keep your eyes open. · Move slowly so that you do not fall. · If your doctor recommends medicine, take it exactly as directed. · Do not drive while you are having vertigo. Certain exercises, called Carey-Daroff exercises, can help decrease vertigo. To do Carey-Daroff exercises:  · Sit on the edge of a bed or sofa and quickly lie down on the side that causes the worst vertigo. Lie on your side with your ear down. · Stay in this position for at least 30 seconds or until the vertigo goes away. · Sit up. If this causes vertigo, wait for it to stop. · Repeat the procedure on the other side. · Repeat this 10 times. Do these exercises 2 times a day until the vertigo is gone. When should you call for help? Call 911 anytime you think you may need emergency care. For example, call if:    · You passed out (lost consciousness).     · You have symptoms of a stroke.  These may include:  ? Sudden numbness, tingling, weakness, or loss of movement in your face, arm, or leg, especially on only one side of your body.  ? Sudden vision changes. ? Sudden trouble speaking. ? Sudden confusion or trouble understanding simple statements. ? Sudden problems with walking or balance. ? A sudden, severe headache that is different from past headaches.    Call your doctor now or seek immediate medical care if:    · Vertigo occurs with a fever, a headache, or ringing in your ears.     · You have new or increased nausea and vomiting.    Watch closely for changes in your health, and be sure to contact your doctor if:    · Vertigo gets worse or happens more often.     · Vertigo has not gotten better after 2 weeks. Where can you learn more? Go to http://yamilet-constantin.info/  Enter S473 in the search box to learn more about \"Vertigo: Care Instructions. \"  Current as of: July 28, 2019Content Version: 12.4  © 9186-7214 Healthwise[employer]. Care instructions adapted under license by [employer] (which disclaims liability or warranty for this information). If you have questions about a medical condition or this instruction, always ask your healthcare professional. [place] [address] any warranty or liability for your use of this information.

## 2020-05-18 NOTE — TELEPHONE ENCOUNTER
----- Message from Jose Angel Valdez sent at 5/18/2020 12:08 PM EDT -----  Env General Message/Vendor Calls    Caller's first and last name: Tara Gupta with ([facility])      Reason for call: Regarding scheduling pt for a face to face apt for vertigo per NPShahram.       Call back required yes/no and why: Yes       Best contact number(s): [phone]      Details to clarify the request:      Jose Angel Valdez

## 2020-05-18 NOTE — Clinical Note
DAVIE Chatman. I just saw your patient, Ms. Nena Talavera. She has had some improvement of her vertigo, though not completely gone. Agree that it is likely BPPV. She did have middle ear effusions on exam so treating that, hoping to avoid antibiotics but will re-eval in a few days.  Just wanted to loop you in!  ROYER

## 2020-05-18 NOTE — PROGRESS NOTES
History of Present Illness:     Chief Complaint   Patient presents with    Follow-up     Patient is coming in because she started with nausea and dizziness about a week ago. NP stated that if she did not do better with medication to call back and patient states she stopped medication because it was not helping She stated that her daughter had mentioned she could have vertigo. Patient states she has had right ear pain of 1 in the last 24 hrs. Lupillo Kan is a 63 y.o. female presents for nausea and dizziness that started 1 week ago. Evaluated via telemed on 5/15 and was started on Meclizine for BPPV. Pt reports some improvement in her symptoms since onset 1 week ago; however, there is still no complete resolution. Continues to have dizziness with positional changes (turning over in bed, bending over, turning abruptly) and occasional nausea. Episodes are brief, lasting seconds. Started having ear pain this AM. Denies ringing in the ears or decreased hearing. PMH (REVIEWED):  Past Medical History:   Diagnosis Date    Arthritis     Nausea & vomiting        Current Medications/Allergies (REVIEWED):     Current Outpatient Medications on File Prior to Visit   Medication Sig Dispense Refill    vit C/multivit-mins/calcium/D3 (EMERGEN-C VITAMIN D-CALCIUM PO) Take  by mouth.  meclizine (ANTIVERT) 25 mg tablet Take 1 Tab by mouth three (3) times daily as needed for Dizziness for up to 10 days. 21 Tab 0    OTHER Oil of Oregano daily      valACYclovir (VALTREX) 1 gram tablet Take 2,000 mg by mouth every eight to twelve (8-12) hours as needed. X 2 doses       No current facility-administered medications on file prior to visit. Allergies   Allergen Reactions    Codeine Itching         Review of Systems:     Review of Systems   Constitutional: Negative for chills, diaphoresis and fever. HENT: Positive for ear pain.  Negative for congestion, ear discharge, hearing loss, sinus pain, sore throat and tinnitus. Eyes: Negative for blurred vision. Respiratory: Negative for cough, sputum production and shortness of breath. Cardiovascular: Negative for chest pain and palpitations. Gastrointestinal: Positive for nausea. Negative for vomiting. Neurological: Positive for dizziness. Negative for tingling, sensory change, focal weakness, weakness and headaches. Objective:     Vitals:    05/18/20 1428   BP: 98/64   Pulse: 60   Resp: 16   Temp: 98.3 °F (36.8 °C)   TempSrc: Oral   SpO2: 98%   Weight: 135 lb 6.4 oz (61.4 kg)   Height: 5' 4\" (1.626 m)       Physical Exam:  General appearance - alert, well appearing, and in no distress  Mental status - alert, oriented to person, place, and time  Eyes - pupils equal and reactive, extraocular eye movements intact, +nystagmus with right lateral gaze. Ears - Bilateral serous effusions. Left effusion clear, 1/3 way up TM; no bulging, injection or erythema. Right effusion cloudy, 1/3 way up TM, slightly dull; no bulging, injection or erythema. Nose - mucosal congestion and mucosal erythema  Mouth - mucous membranes moist, pharynx normal without lesions  Neurological - alert, oriented, normal speech, no focal findings or movement disorder noted. CN 2-12 intact bilaterally. Normal gait. Recent Labs:  No results found for this or any previous visit (from the past 12 hour(s)). Assessment and Plan:       ICD-10-CM ICD-9-CM    1. Vertigo R42 780.4    2. Acute TERRI (middle ear effusion), bilateral H65.193 381.00 amoxicillin (AMOXIL) 500 mg capsule   3.  Eustachian tube dysfunction, bilateral H69.83 381.81      Suspect middle ear effusion vs AOM   Vertigo is improving overall, likely BPPV based on hx  Will treat with decongestant and nasal steroids  Consider abx therapy, josefina if worsening right ear pain on re-eval    Follow up: 3 day phone follow up    Anjelica Hill MD    We discussed the expected course, resolution and complications of the diagnosis(es) in detail. Medication risks, benefits, costs, interactions, and alternatives were discussed as indicated. I advised her to contact the office if her condition worsens, changes or fails to improve as anticipated. She expressed understanding with the diagnosis(es) and plan.

## 2020-05-18 NOTE — TELEPHONE ENCOUNTER
[phone]    Spoke with patient and scheduled an appointment for today. Monday, May 18, 2020 02:15 PM f [facility], CRISTIAN, New Patient, 30min     referred by [facility].---------Regarding scheduling pt for a face to face apt for vertigo per Shahram ASKEW.  , cm-

## 2020-06-24 ENCOUNTER — HOSPITAL ENCOUNTER (OUTPATIENT)
Dept: MAMMOGRAPHY | Age: 60
Discharge: HOME | End: 2020-06-24
Attending: INTERNAL MEDICINE
Payer: COMMERCIAL

## 2020-06-24 DIAGNOSIS — Z12.31 VISIT FOR SCREENING MAMMOGRAM: ICD-10-CM

## 2020-06-24 PROCEDURE — 77067 SCR MAMMO BI INCL CAD: CPT

## 2021-02-01 NOTE — PROGRESS NOTES
Subjective:   63 y.o. female for Well Woman Check. Her gyne and breast care is done elsewhere by her Ob-Gyne physician. Right ear (side of parotid neoplasm) intermittent blockage. Doesn't feel blocked but ear hurts sometimes. In March/April had severe vertigo - Saw [facility] and then in office visit at [facility]. Saw fluid behind TM, placed on abx and better. Yoga, sit ups seem to trigger pain. Intermittent pain 10-15 seconds. No hearing issues. No further dizziness. Fingers stiff when awakening. Occ numb (2nd-4th) when awakens. Does hand exercises and improves. No issues during the day. Left bunion. Intermittent pain left great toe. Patient Active Problem List    Diagnosis Date Noted    Vitreous floaters of both eyes 01/15/2018    Blepharitis 05/01/2017    Open angle with borderline findings and low glaucoma risk in both eyes 05/01/2017    Tear film insufficiency 05/01/2017    Bunion, left 10/11/2016    Parotid neoplasm 08/29/2011     Current Outpatient Medications   Medication Sig Dispense Refill    vit C/multivit-mins/calcium/D3 (EMERGEN-C VITAMIN D-CALCIUM PO) Take  by mouth.  valACYclovir (VALTREX) 1 gram tablet Take 2,000 mg by mouth every eight to twelve (8-12) hours as needed.  X 2 doses      OTHER Oil of Oregano daily       Allergies   Allergen Reactions    Codeine Itching     Past Medical History:   Diagnosis Date    Arthritis     Nausea & vomiting      Past Surgical History:   Procedure Laterality Date    ENDOSCOPY, COLON, DIAGNOSTIC  5/2009    Chantelle Riding)     [address]    HX GYN  2007    ABLATION (+bladder sling)    HX HEENT  1993    SINUS    HX HEENT  2005    LASIK    HX ORTHOPAEDIC  2004    R HIP    IMPLANT BREAST SILICONE/EQ  9854    [place] BREAST SURGERY PROCEDURE UNLISTED  1996    IMPLANTS     Family History   Problem Relation Age of Onset    Cancer Mother     Arthritis-rheumatoid Mother     Lung Disease Mother         pulm fibrosis    Macular Degen Father     Lung Disease Maternal Uncle         pulm fibrosis    Lung Disease Maternal Grandmother         pulm fibrosis     Social History     Tobacco Use    Smoking status: Never Smoker    Smokeless tobacco: Never Used   Substance Use Topics    Alcohol use: Yes     Alcohol/week: 2.0 - 3.0 standard drinks     Types: 2 - 3 Standard drinks or equivalent per week     Comment: OCCASIONAL        Lab Results   Component Value Date/Time    WBC 5.9 02/25/2020 12:00 AM    HGB 13.3 02/25/2020 12:00 AM    HCT 39.7 02/25/2020 12:00 AM    PLATELET 051 49/82/3964 10:15 AM    MCV 99 (H) 02/25/2020 12:00 AM     Lab Results   Component Value Date/Time    Glucose 88 02/25/2020 12:00 AM    LDL, calculated 111 (H) 02/25/2020 12:00 AM    Creatinine 0.71 02/25/2020 12:00 AM      Lab Results   Component Value Date/Time    Cholesterol, total 226 (H) 02/25/2020 12:00 AM    HDL Cholesterol 104 02/25/2020 12:00 AM    LDL, calculated 111 (H) 02/25/2020 12:00 AM    Triglyceride 57 02/25/2020 12:00 AM     Lab Results   Component Value Date/Time    ALT (SGPT) 20 02/25/2020 12:00 AM    Alk. phosphatase 79 02/25/2020 12:00 AM    Bilirubin, total 0.4 02/25/2020 12:00 AM    Albumin 4.3 02/25/2020 12:00 AM    Protein, total 7.0 02/25/2020 12:00 AM    PLATELET 657 46/04/7192 10:15 AM     Lab Results   Component Value Date/Time    GFR est non-AA 92 02/25/2020 12:00 AM    GFR est  02/25/2020 12:00 AM    Creatinine 0.71 02/25/2020 12:00 AM    BUN 18 02/25/2020 12:00 AM    Sodium 138 02/25/2020 12:00 AM    Potassium 4.5 02/25/2020 12:00 AM    Chloride 99 02/25/2020 12:00 AM    CO2 23 02/25/2020 12:00 AM     Lab Results   Component Value Date/Time    TSH 2.110 02/25/2020 12:00 AM         Specific concerns today: see above.     Review of Systems  Constitutional: negative  Eyes: negative  Ears, nose, mouth, throat, and face: negative except for decreased hearing 1 ear, ear pain as above  Respiratory: negative  Cardiovascular: negative  Gastrointestinal: negative except for mild constipation improved with diet. Genitourinary:negative except for mild stress urgency  Integument/breast: negative  Hematologic/lymphatic: negative  Musculoskeletal:negative  Neurological: negative  Behavioral/Psych: negative  Endocrine: negative  Allergic/Immunologic: negative    Walk/jog every day, 20 minutes abs and arms. Objective:   Blood pressure (!) 107/52, pulse (!) 52, temperature (!) 95.9 °F (35.5 °C), temperature source Temporal, resp. rate 16, height 5' 4\" (1.626 m), weight 133 lb 8 oz (60.6 kg), SpO2 99 %. Physical Examination:   General appearance - alert, well appearing, and in no distress and oriented to person, place, and time  Mental status - alert, oriented to person, place, and time, normal mood, behavior, speech, dress, motor activity, and thought processes  Eyes - pupils equal and reactive, extraocular eye movements intact  Ears - bilateral TM's and external ear canals normal  Nose - normal and patent, no erythema, discharge or polyps  Mouth - mucous membranes moist, pharynx normal without lesions  Neck - supple, no significant adenopathy, carotids upstroke normal bilaterally, no bruits, thyroid exam: thyroid is normal in size without nodules or tenderness. Mild popping bilat jaw with opening and closing mouth. Lymphatics - no palpable lymphadenopathy, no hepatosplenomegaly  Chest - clear to auscultation, no wheezes, rales or rhonchi, symmetric air entry  Heart - normal rate, regular rhythm, normal S1, S2, no murmurs, rubs, clicks or gallops  Abdomen - soft, nontender, nondistended, no masses or organomegaly  bowel sounds normal  Breasts - breasts appear normal, no suspicious masses, no skin or nipple changes or axillary nodes. bilat implants.    Back exam - full range of motion, no tenderness, palpable spasm or pain on motion  Neurological - alert, oriented, normal speech, no focal findings or movement disorder noted  Musculoskeletal - no joint tenderness, deformity or swelling. Left bunion with 2nd hammertoe  Extremities - peripheral pulses normal, no pedal edema, no clubbing or cyanosis  Skin - normal coloration and turgor, no rashes, no suspicious skin lesions noted     Assessment/Plan:   60yo female here for PE  Right ear pain - ? TMJ. Pt will see if worse with eating/in am/if grinding teeth. If no improvement will f/u with her previous ENT  Left bunion - discussed wider shoes, toe spaces.  -check labs.     call if any problems  Orders Placed This Encounter    METABOLIC PANEL, COMPREHENSIVE    LIPID PANEL    CBC W/O DIFF    TSH 3RD GENERATION    VITAMIN D, 25 HYDROXY

## 2021-02-02 ENCOUNTER — OFFICE VISIT (OUTPATIENT)
Dept: INTERNAL MEDICINE CLINIC | Age: 61
End: 2021-02-02
Payer: COMMERCIAL

## 2021-02-02 VITALS
HEIGHT: 64 IN | DIASTOLIC BLOOD PRESSURE: 52 MMHG | SYSTOLIC BLOOD PRESSURE: 107 MMHG | WEIGHT: 133.5 LBS | BODY MASS INDEX: 22.79 KG/M2 | HEART RATE: 52 BPM | OXYGEN SATURATION: 99 % | TEMPERATURE: 95.9 F | RESPIRATION RATE: 16 BRPM

## 2021-02-02 DIAGNOSIS — Z00.00 ROUTINE GENERAL MEDICAL EXAMINATION AT A HEALTH CARE FACILITY: Primary | ICD-10-CM

## 2021-02-02 DIAGNOSIS — Z00.00 ROUTINE GENERAL MEDICAL EXAMINATION AT A HEALTH CARE FACILITY: ICD-10-CM

## 2021-02-02 PROCEDURE — 99396 PREV VISIT EST AGE 40-64: CPT | Performed by: INTERNAL MEDICINE

## 2021-02-02 NOTE — PROGRESS NOTES
Patient has been informed of any other discussion outside the parameters of the physical could result in other charges including a co pay, patient verbalized understanding. 1. Have you been to the ER, urgent care clinic since your last visit? Hospitalized since your last visit? No    2. Have you seen or consulted any other health care providers outside of the [address] [place] [address] since your last visit? Include any pap smears or colon screening.  Yes    Chief Complaint   Patient presents with    Complete Physical

## 2021-02-03 LAB
25(OH)D3+25(OH)D2 SERPL-MCNC: 36.9 NG/ML (ref 30–100)
ALBUMIN SERPL-MCNC: 4.4 G/DL (ref 3.8–4.8)
ALBUMIN/GLOB SERPL: 1.8 {RATIO} (ref 1.2–2.2)
ALP SERPL-CCNC: 89 IU/L (ref 39–117)
ALT SERPL-CCNC: 16 IU/L (ref 0–32)
AST SERPL-CCNC: 24 IU/L (ref 0–40)
BILIRUB SERPL-MCNC: 0.4 MG/DL (ref 0–1.2)
BUN SERPL-MCNC: 14 MG/DL (ref 8–27)
BUN/CREAT SERPL: 24 (ref 12–28)
CALCIUM SERPL-MCNC: 9.3 MG/DL (ref 8.7–10.3)
CHLORIDE SERPL-SCNC: 101 MMOL/L (ref 96–106)
CHOLEST SERPL-MCNC: 226 MG/DL (ref 100–199)
CO2 SERPL-SCNC: 27 MMOL/L (ref 20–29)
CREAT SERPL-MCNC: 0.59 MG/DL (ref 0.57–1)
ERYTHROCYTE [DISTWIDTH] IN BLOOD BY AUTOMATED COUNT: 11.6 % (ref 11.7–15.4)
GLOBULIN SER CALC-MCNC: 2.5 G/DL (ref 1.5–4.5)
GLUCOSE SERPL-MCNC: 88 MG/DL (ref 65–99)
HCT VFR BLD AUTO: 41.7 % (ref 34–46.6)
HDLC SERPL-MCNC: 107 MG/DL
HGB BLD-MCNC: 13.9 G/DL (ref 11.1–15.9)
INTERPRETATION, 910389: NORMAL
LDLC SERPL CALC-MCNC: 109 MG/DL (ref 0–99)
MCH RBC QN AUTO: 32.7 PG (ref 26.6–33)
MCHC RBC AUTO-ENTMCNC: 33.3 G/DL (ref 31.5–35.7)
MCV RBC AUTO: 98 FL (ref 79–97)
PLATELET # BLD AUTO: 168 X10E3/UL (ref 150–450)
POTASSIUM SERPL-SCNC: 4.4 MMOL/L (ref 3.5–5.2)
PROT SERPL-MCNC: 6.9 G/DL (ref 6–8.5)
RBC # BLD AUTO: 4.25 X10E6/UL (ref 3.77–5.28)
SODIUM SERPL-SCNC: 140 MMOL/L (ref 134–144)
TRIGL SERPL-MCNC: 56 MG/DL (ref 0–149)
TSH SERPL DL<=0.005 MIU/L-ACNC: 2.23 UIU/ML (ref 0.45–4.5)
VLDLC SERPL CALC-MCNC: 10 MG/DL (ref 5–40)
WBC # BLD AUTO: 4.8 X10E3/UL (ref 3.4–10.8)

## 2021-06-07 ENCOUNTER — TRANSCRIBE ORDER (OUTPATIENT)
Dept: SCHEDULING | Age: 61
End: 2021-06-07

## 2021-06-07 DIAGNOSIS — Z12.31 VISIT FOR SCREENING MAMMOGRAM: Primary | ICD-10-CM

## 2021-10-13 ENCOUNTER — HOSPITAL ENCOUNTER (OUTPATIENT)
Dept: MAMMOGRAPHY | Age: 61
Discharge: HOME | End: 2021-10-13
Attending: INTERNAL MEDICINE
Payer: COMMERCIAL

## 2021-10-13 DIAGNOSIS — Z12.31 VISIT FOR SCREENING MAMMOGRAM: ICD-10-CM

## 2021-10-13 PROCEDURE — 77067 SCR MAMMO BI INCL CAD: CPT

## 2022-02-07 NOTE — PROGRESS NOTES
Subjective:   60 y.o. female for Well Woman Check. Her gyne and breast care is done elsewhere by her Ob-Gyne physician. Patient Active Problem List    Diagnosis Date Noted    Vitreous floaters of both eyes 01/15/2018    Blepharitis 05/01/2017    Open angle with borderline findings and low glaucoma risk in both eyes 05/01/2017    Tear film insufficiency 05/01/2017    Bunion, left 10/11/2016    Parotid neoplasm 08/29/2011     Current Outpatient Medications   Medication Sig Dispense Refill    valACYclovir (VALTREX) 1 gram tablet Take 2 Tablets by mouth every twelve (12) hours. X 2 doses 30 Tablet 0    vit C/multivit-mins/calcium/D3 (EMERGEN-C VITAMIN D-CALCIUM PO) Take  by mouth. Allergies   Allergen Reactions    Codeine Itching    Resinol [Resorcinol-Alka-Julissa-Zinc-Jose De Jesus] Itching and Rash     Past Medical History:   Diagnosis Date    Arthritis     Nausea & vomiting      Past Surgical History:   Procedure Laterality Date    ENDOSCOPY, COLON, DIAGNOSTIC  5/2009    Tor Mohs)     [address]    HX GYN  2007    ABLATION (+bladder sling)    HX HEENT  1993    SINUS    HX HEENT  2005    LASIK    HX ORTHOPAEDIC  2004    R HIP    IMPLANT BREAST SILICONE/EQ  6890    [place] BREAST SURGERY PROCEDURE UNLISTED  1996    IMPLANTS     Family History   Problem Relation Age of Onset    Cancer Mother     Arthritis-rheumatoid Mother     Lung Disease Mother         pulm fibrosis    Macular Degen Father     Lung Disease Maternal Uncle         pulm fibrosis    Lung Disease Maternal Grandmother         pulm fibrosis     Social History     Tobacco Use    Smoking status: Never Smoker    Smokeless tobacco: Never Used   Substance Use Topics    Alcohol use:  Yes     Alcohol/week: 2.0 - 3.0 standard drinks     Types: 2 - 3 Standard drinks or equivalent per week     Comment: OCCASIONAL        Lab Results   Component Value Date/Time    WBC 4.8 02/02/2021 01:00 PM    HGB 13.9 02/02/2021 01:00 PM HCT 41.7 02/02/2021 01:00 PM    PLATELET 697 14/91/2859 01:00 PM    MCV 98 (H) 02/02/2021 01:00 PM     Lab Results   Component Value Date/Time    Glucose 88 02/02/2021 01:00 PM    LDL, calculated 109 (H) 02/02/2021 01:00 PM    LDL, calculated 111 (H) 02/25/2020 12:00 AM    Creatinine 0.59 02/02/2021 01:00 PM      Lab Results   Component Value Date/Time    Cholesterol, total 226 (H) 02/02/2021 01:00 PM    HDL Cholesterol 107 02/02/2021 01:00 PM    LDL, calculated 109 (H) 02/02/2021 01:00 PM    LDL, calculated 111 (H) 02/25/2020 12:00 AM    Triglyceride 56 02/02/2021 01:00 PM     Lab Results   Component Value Date/Time    ALT (SGPT) 16 02/02/2021 01:00 PM    Alk. phosphatase 89 02/02/2021 01:00 PM    Bilirubin, total 0.4 02/02/2021 01:00 PM    Albumin 4.4 02/02/2021 01:00 PM    Protein, total 6.9 02/02/2021 01:00 PM    PLATELET 907 59/36/6593 01:00 PM     Lab Results   Component Value Date/Time    GFR est non-AA 99 02/02/2021 01:00 PM    GFR est  02/02/2021 01:00 PM    Creatinine 0.59 02/02/2021 01:00 PM    BUN 14 02/02/2021 01:00 PM    Sodium 140 02/02/2021 01:00 PM    Potassium 4.4 02/02/2021 01:00 PM    Chloride 101 02/02/2021 01:00 PM    CO2 27 02/02/2021 01:00 PM     Lab Results   Component Value Date/Time    TSH 2.230 02/02/2021 01:00 PM         Specific concerns today:   Pulled muscle right groin. ? Picking up granddaughter. [place] was getting better but then flared again this weekend. Urine smells \"terrible\"  Not dark in color. Drinks a lot of water. utd with Shingrix - April. Review of Systems  Constitutional: negative  Eyes: negative  Ears, nose, mouth, throat, and face: negative  Respiratory: negative  Cardiovascular: negative  Gastrointestinal: negative except for mild constipation - mostly controlled with diet  Genitourinary:negative except for urge incontinence  Integument/breast: negative  Hematologic/lymphatic: negative  Musculoskeletal:negative except for mild lateral hip pain. Neurological: negative  Behavioral/Psych: negative  Endocrine: negative  Allergic/Immunologic: negative    Objective:   Blood pressure 102/64, pulse (!) 59, temperature 98.2 °F (36.8 °C), temperature source Temporal, resp. rate 14, height 5' 4\" (1.626 m), weight 134 lb 6.4 oz (61 kg), SpO2 99 %. Physical Examination:   General appearance - alert, well appearing, and in no distress and oriented to person, place, and time  Mental status - alert, oriented to person, place, and time, normal mood, behavior, speech, dress, motor activity, and thought processes  Eyes - pupils equal and reactive, extraocular eye movements intact  Ears - bilateral TM's and external ear canals normal  Nose - normal and patent, no erythema, discharge or polyps  Mouth - mucous membranes moist, pharynx normal without lesions  Neck - supple, no significant adenopathy, carotids upstroke normal bilaterally, no bruits, thyroid exam: thyroid is normal in size without nodules or tenderness  Lymphatics - no palpable lymphadenopathy, no hepatosplenomegaly  Chest - clear to auscultation, no wheezes, rales or rhonchi, symmetric air entry  Heart - normal rate, regular rhythm, normal S1, S2, no murmurs, rubs, clicks or gallops  Abdomen - soft, nontender, nondistended, no masses or organomegaly  bowel sounds normal.  Right inguinal hernia. Easily reducible  Back exam - full range of motion, no tenderness, palpable spasm or pain on motion  Neurological - alert, oriented, normal speech, no focal findings or movement disorder noted  Musculoskeletal - no joint tenderness, deformity or swelling  Extremities - peripheral pulses normal, no pedal edema, no clubbing or cyanosis  Skin - normal coloration and turgor, no rashes, no suspicious skin lesions noted     Assessment/Plan:   64yo female here for PE  call if any problems  Diagnoses and all orders for this visit:    1.  Routine general medical examination at a health care facility  -     METABOLIC PANEL, COMPREHENSIVE; Future  -     LIPID PANEL; Future  -     CBC W/O DIFF; Future  -     TSH 3RD GENERATION; Future  -     VITAMIN D, 25 HYDROXY; Future  -     URINALYSIS W/ RFLX MICROSCOPIC; Future    2. Osteopenia of multiple sites  -     DEXA BONE DENSITY STUDY AXIAL; Future    3. Postmenopausal  -     DEXA BONE DENSITY STUDY AXIAL; Future    4. Right inguinal hernia  -     REFERRAL TO GENERAL SURGERY    Other orders  -     valACYclovir (VALTREX) 1 gram tablet; Take 2 Tablets by mouth every twelve (12) hours.  X 2 doses

## 2022-02-08 ENCOUNTER — OFFICE VISIT (OUTPATIENT)
Dept: INTERNAL MEDICINE CLINIC | Age: 62
End: 2022-02-08
Payer: COMMERCIAL

## 2022-02-08 VITALS
OXYGEN SATURATION: 99 % | HEART RATE: 59 BPM | HEIGHT: 64 IN | BODY MASS INDEX: 22.94 KG/M2 | TEMPERATURE: 98.2 F | SYSTOLIC BLOOD PRESSURE: 102 MMHG | WEIGHT: 134.4 LBS | DIASTOLIC BLOOD PRESSURE: 64 MMHG | RESPIRATION RATE: 14 BRPM

## 2022-02-08 DIAGNOSIS — M85.89 OSTEOPENIA OF MULTIPLE SITES: ICD-10-CM

## 2022-02-08 DIAGNOSIS — Z78.0 POSTMENOPAUSAL: ICD-10-CM

## 2022-02-08 DIAGNOSIS — K40.90 RIGHT INGUINAL HERNIA: ICD-10-CM

## 2022-02-08 DIAGNOSIS — Z00.00 ROUTINE GENERAL MEDICAL EXAMINATION AT A HEALTH CARE FACILITY: Primary | ICD-10-CM

## 2022-02-08 PROCEDURE — 99396 PREV VISIT EST AGE 40-64: CPT | Performed by: INTERNAL MEDICINE

## 2022-02-08 RX ORDER — VALACYCLOVIR HYDROCHLORIDE 1 G/1
2000 TABLET, FILM COATED ORAL EVERY 12 HOURS
Qty: 30 TABLET | Refills: 0 | Status: SHIPPED | OUTPATIENT
Start: 2022-02-08 | End: 2022-04-27 | Stop reason: SDUPTHER

## 2022-02-08 NOTE — PATIENT INSTRUCTIONS
Kegel Exercises: Care Instructions  Overview     Kegel exercises strengthen muscles around the bladder. These muscles control the flow of urine. Kegel exercises are sometime called \"pelvic floor\" exercises. They can help prevent urine leakage and keep the pelvic organs in place. Kegel exercises can strengthen pelvic muscles that have been weakened by age, pregnancy, childbirth, and surgery. They may help prevent or treat urine leakage. You do Kegel exercises by tightening the muscles you use when you urinate. You will likely need to do these exercises for several weeks to get better. Follow-up care is a key part of your treatment and safety. Be sure to make and go to all appointments, and call your doctor if you are having problems. It's also a good idea to know your test results and keep a list of the medicines you take. How can you care for yourself at home? · Do Kegel exercises. ? Find the muscles you need to strengthen. To do this, tighten the muscles that stop your urine while you are going to the bathroom. These are the same muscles you squeeze during Kegel exercises. ? Squeeze the muscles as hard as you can. Your belly and thighs should not move. ? Hold the squeeze for 3 seconds. Then relax for 3 seconds. ? Start with 3 seconds, and then add 1 second each week until you are able to squeeze for 10 seconds. ? Repeat the exercise 10 to 15 times for each session. Do three or more sessions each day. · You can check to see if you are using the right muscles. ? Tighten the muscles that help you stop passing gas or keep you from urinating. Make sure you aren't using your stomach, leg, or buttock muscles. ? Place a finger in your vagina and squeeze around it. You are doing them right when you feel pressure around your finger. Your doctor may also suggest that you put special weights in your vagina while you do the exercises.   · Check with your doctor if you don't notice a difference after trying these exercises for several weeks. Your doctor may suggest getting help from a physical therapist or recommend other treatment. Where can you learn more? Go to http://www.gray.com/  Enter N554 in the search box to learn more about \"Kegel Exercises: Care Instructions. \"  Current as of: February 10, 2021               Content Version: 13.0  © 0790-3856 [employer]. Care instructions adapted under license by [employer] (which disclaims liability or warranty for this information). If you have questions about a medical condition or this instruction, always ask your healthcare professional. [place] [address] any warranty or liability for your use of this information.

## 2022-02-08 NOTE — PROGRESS NOTES
Reviewed record in preparation for visit and have obtained necessary documentation. Identified pt with two pt identifiers(name and ). Chief Complaint   Patient presents with    Complete Physical     Vitals:    22 0940   BP: 102/64   Pulse: (!) 59   Resp: 14   Temp: 98.2 °F (36.8 °C)   TempSrc: Temporal   SpO2: 99%   Weight: 134 lb 6.4 oz (61 kg)   Height: 5' 4\" (1.626 m)        Health Maintenance Due   Topic Date Due    COVID-19 Vaccine (1) Never done    Shingles Vaccine (2 of 2) 2019    Yearly Flu Vaccine (1) 2021    Depresssion Screening  2022       Ms. Rachel Doty has a reminder for a \"due or due soon\" health maintenance. I have asked that she discuss this further with her primary care provider for follow-up on this health maintenance. Coordination of Care Questionnaire:  :     1) Have you been to an emergency room, urgent care clinic since your last visit? no   Hospitalized since your last visit? no             2) Have you seen or consulted any other health care providers outside of [address] [place] [address] since your last visit? no  (Include any pap smears or colon screenings in this section.)    3) In the event something were to happen to you and you were unable to speak on your behalf, do you have an Advance Directive/ Living Will in place stating your wishes? YES    Do you have an Advance Directive on file? no    4) Are you interested in receiving information on Advance Directives? NO    Patient is accompanied by self I have received verbal consent from Cheryl Aranda to discuss any/all medical information while they are present in the room.

## 2022-02-09 LAB
25(OH)D3+25(OH)D2 SERPL-MCNC: 36.3 NG/ML (ref 30–100)
ALBUMIN SERPL-MCNC: 4.2 G/DL (ref 3.8–4.8)
ALBUMIN/GLOB SERPL: 1.5 {RATIO} (ref 1.2–2.2)
ALP SERPL-CCNC: 91 IU/L (ref 44–121)
ALT SERPL-CCNC: 17 IU/L (ref 0–32)
APPEARANCE UR: CLEAR
AST SERPL-CCNC: 21 IU/L (ref 0–40)
BILIRUB SERPL-MCNC: 0.3 MG/DL (ref 0–1.2)
BILIRUB UR QL STRIP: NEGATIVE
BUN SERPL-MCNC: 15 MG/DL (ref 8–27)
BUN/CREAT SERPL: 22 (ref 12–28)
CALCIUM SERPL-MCNC: 9.2 MG/DL (ref 8.7–10.3)
CHLORIDE SERPL-SCNC: 101 MMOL/L (ref 96–106)
CHOLEST SERPL-MCNC: 204 MG/DL (ref 100–199)
CO2 SERPL-SCNC: 24 MMOL/L (ref 20–29)
COLOR UR: YELLOW
CREAT SERPL-MCNC: 0.67 MG/DL (ref 0.57–1)
ERYTHROCYTE [DISTWIDTH] IN BLOOD BY AUTOMATED COUNT: 11.9 % (ref 11.7–15.4)
GLOBULIN SER CALC-MCNC: 2.8 G/DL (ref 1.5–4.5)
GLUCOSE SERPL-MCNC: 93 MG/DL (ref 65–99)
GLUCOSE UR QL STRIP: NEGATIVE
HCT VFR BLD AUTO: 43 % (ref 34–46.6)
HDLC SERPL-MCNC: 108 MG/DL
HGB BLD-MCNC: 13.8 G/DL (ref 11.1–15.9)
HGB UR QL STRIP: NEGATIVE
IMP & REVIEW OF LAB RESULTS: NORMAL
KETONES UR QL STRIP: NEGATIVE
LDLC SERPL CALC-MCNC: 83 MG/DL (ref 0–99)
LEUKOCYTE ESTERASE UR QL STRIP: NEGATIVE
MCH RBC QN AUTO: 31.6 PG (ref 26.6–33)
MCHC RBC AUTO-ENTMCNC: 32.1 G/DL (ref 31.5–35.7)
MCV RBC AUTO: 98 FL (ref 79–97)
MICRO URNS: NORMAL
NITRITE UR QL STRIP: NEGATIVE
PH UR STRIP: 6.5 [PH] (ref 5–7.5)
PLATELET # BLD AUTO: 185 X10E3/UL (ref 150–450)
POTASSIUM SERPL-SCNC: 3.9 MMOL/L (ref 3.5–5.2)
PROT SERPL-MCNC: 7 G/DL (ref 6–8.5)
PROT UR QL STRIP: NEGATIVE
RBC # BLD AUTO: 4.37 X10E6/UL (ref 3.77–5.28)
SODIUM SERPL-SCNC: 139 MMOL/L (ref 134–144)
SP GR UR STRIP: 1.01 (ref 1–1.03)
TRIGL SERPL-MCNC: 72 MG/DL (ref 0–149)
TSH SERPL DL<=0.005 MIU/L-ACNC: 2.3 UIU/ML (ref 0.45–4.5)
UROBILINOGEN UR STRIP-MCNC: 0.2 MG/DL (ref 0.2–1)
VLDLC SERPL CALC-MCNC: 13 MG/DL (ref 5–40)
WBC # BLD AUTO: 3.8 X10E3/UL (ref 3.4–10.8)

## 2022-02-14 ENCOUNTER — OFFICE VISIT (OUTPATIENT)
Dept: SURGERY | Age: 62
End: 2022-02-14
Payer: COMMERCIAL

## 2022-02-14 VITALS
OXYGEN SATURATION: 97 % | HEIGHT: 64 IN | TEMPERATURE: 98.2 F | SYSTOLIC BLOOD PRESSURE: 96 MMHG | HEART RATE: 71 BPM | RESPIRATION RATE: 16 BRPM | WEIGHT: 136 LBS | BODY MASS INDEX: 23.22 KG/M2 | DIASTOLIC BLOOD PRESSURE: 53 MMHG

## 2022-02-14 DIAGNOSIS — R10.31 BILATERAL GROIN PAIN: Primary | ICD-10-CM

## 2022-02-14 DIAGNOSIS — R10.32 BILATERAL GROIN PAIN: Primary | ICD-10-CM

## 2022-02-14 PROCEDURE — 99202 OFFICE O/P NEW SF 15 MIN: CPT | Performed by: SURGERY

## 2022-02-14 NOTE — PROGRESS NOTES
1. Have you been to the ER, urgent care clinic since your last visit? Hospitalized since your last visit? No    2. Have you seen or consulted any other health care providers outside of the [address] [place] [address] since your last visit? Include any pap smears or colon screening.  No

## 2022-02-14 NOTE — PROGRESS NOTES
Buck Huff is a 60 y.o. female who is referred by Dr. Monik Ramírez for further evaluation of bilateral groin pain and a possible right inguinal hernia. Ms. Rolanda Grider tells me that she recently began experiencing discomfort in both her right and left groins. (Right > Left) The pain occurs with coughing or exertion. According to Ms. Rolanda Grider she experienced severe episodes of groin pain on January 22, 2022 and February 5, 2022. No obvious bulge in either right or left groin. No associated nausea or vomiting. No chronic cough. Occasional constipation. She has otherwise been in her usual state of health. Past Medical History:   Diagnosis Date    Arthritis     Bilateral groin pain 2/14/2022    Nausea & vomiting      Past Surgical History:   Procedure Laterality Date    ENDOSCOPY, COLON, DIAGNOSTIC  5/2009    [facility])    HX DILATION AND CURETTAGE  1993    HX GYN  2007    ABLATION (+bladder sling)    HX HEENT  1993    SINUS    HX HEENT  2005    LASIK    HX ORTHOPAEDIC  2004    R HIP    IMPLANT BREAST SILICONE/EQ  0683    [place] BREAST SURGERY PROCEDURE UNLISTED  1996    IMPLANTS     Family History   Problem Relation Age of Onset    Cancer Mother     Arthritis-rheumatoid Mother     Lung Disease Mother         pulm fibrosis    Macular Degen Father     Lung Disease Maternal Uncle         pulm fibrosis    Lung Disease Maternal Grandmother         pulm fibrosis     Social History     Socioeconomic History    Marital status:    Tobacco Use    Smoking status: Never Smoker    Smokeless tobacco: Never Used   Vaping Use    Vaping Use: Never used   Substance and Sexual Activity    Alcohol use: Yes     Alcohol/week: 2.0 - 3.0 standard drinks     Types: 2 - 3 Standard drinks or equivalent per week     Comment: OCCASIONAL    Drug use: No    Sexual activity: Yes     Partners: Male     Review of systems negative except as noted. Review of Systems   Respiratory: Negative for cough.     Gastrointestinal: Positive for abdominal pain (Bilateral groins. (Right>Left)) and constipation (Occasionally. ). Negative for nausea and vomiting. Musculoskeletal: Positive for back pain and joint pain. Physical Exam  Vitals reviewed. Exam conducted with a chaperone present. Constitutional:       General: She is not in acute distress. Appearance: Normal appearance. She is normal weight. HENT:      Head: Normocephalic and atraumatic. Eyes:      General: No scleral icterus. Cardiovascular:      Rate and Rhythm: Normal rate and regular rhythm. Pulmonary:      Effort: Pulmonary effort is normal.      Breath sounds: Normal breath sounds. Abdominal:      General: There is no distension. Palpations: Abdomen is soft. Tenderness: There is no abdominal tenderness. There is no guarding or rebound. Hernia: No hernia is present. There is no hernia in the left inguinal area or right inguinal area. Comments: No apparent inguinal hernia bilaterally. Musculoskeletal:         General: Normal range of motion. Cervical back: Neck supple. Lymphadenopathy:      Cervical: No cervical adenopathy. Neurological:      General: No focal deficit present. Mental Status: She is alert. ASSESSMENT and PLAN  Ms. Lionel Jonas is a 61 yo female with bilateral groin pain and a possible bilateral inguinal hernias. Explained to Ms. Lionel Jonas that it is not clear whether or not she has a right or left inguinal hernia. Will check an ultrasound to better evaluate the right and left groin and will see her following that to review findings with her. Activity as tolerated for now. Discussed plan with Ms. Lionel Jonas and she is agreeable.        CC: Elveria Osler, MD

## 2022-02-16 ENCOUNTER — TRANSCRIBE ORDER (OUTPATIENT)
Dept: SCHEDULING | Age: 62
End: 2022-02-16

## 2022-02-16 DIAGNOSIS — R10.31 ABDOMINAL PAIN, RIGHT LOWER QUADRANT: Primary | ICD-10-CM

## 2022-02-21 ENCOUNTER — HOSPITAL ENCOUNTER (OUTPATIENT)
Dept: ULTRASOUND IMAGING | Age: 62
Discharge: HOME | End: 2022-02-21
Attending: SURGERY
Payer: COMMERCIAL

## 2022-02-21 ENCOUNTER — OFFICE VISIT (OUTPATIENT)
Dept: SURGERY | Age: 62
End: 2022-02-21
Payer: COMMERCIAL

## 2022-02-21 VITALS
TEMPERATURE: 98 F | DIASTOLIC BLOOD PRESSURE: 56 MMHG | BODY MASS INDEX: 22.94 KG/M2 | RESPIRATION RATE: 17 BRPM | SYSTOLIC BLOOD PRESSURE: 90 MMHG | WEIGHT: 134.4 LBS | HEIGHT: 64 IN | OXYGEN SATURATION: 97 % | HEART RATE: 61 BPM

## 2022-02-21 DIAGNOSIS — R10.32 BILATERAL GROIN PAIN: Primary | ICD-10-CM

## 2022-02-21 DIAGNOSIS — R10.31 BILATERAL GROIN PAIN: Primary | ICD-10-CM

## 2022-02-21 DIAGNOSIS — R10.31 BILATERAL GROIN PAIN: ICD-10-CM

## 2022-02-21 DIAGNOSIS — R10.32 BILATERAL GROIN PAIN: ICD-10-CM

## 2022-02-21 PROCEDURE — 76882 US LMTD JT/FCL EVL NVASC XTR: CPT

## 2022-02-21 PROCEDURE — 99212 OFFICE O/P EST SF 10 MIN: CPT | Performed by: SURGERY

## 2022-02-21 NOTE — PROGRESS NOTES
Ms. Surjit Harley returns following bilateral groin ultrasound. She was last seen on February 14, 2022 for evaluation of bilateral groin pain and possible right inguinal hernia. Doing well since then. Still experiencing pain in her right and left groin. (Right > Left)  No obvious bulge in either right or left groin. Bilateral groin ultrasound - 2/21/2022 - No sonographic abnormality in the groin soft tissues on either side. Specifically, there is no inguinal hernia, lymphadenopathy or mass. Reviewed ultrasound. Discussed ultrasound findings with Ms. Surjit Harley today and reassured her that no acute abnormalities were noted on the study and that her symptoms may be musculoskeletal in origin. At this point in time, do not believe that there is an indication for further imaging studies. Activity as tolerated. Suggested to Ms. Surjit Harley that she try anti-inflammatories as needed. Follow up with Dr. Vidya Flores as scheduled. Will see as needed.        CC: Hermelindo Leonardo MD

## 2022-02-21 NOTE — PROGRESS NOTES
1. Have you been to the ER, urgent care clinic since your last visit? Hospitalized since your last visit? No    2. Have you seen  or consulted any other health care providers outside of the [address] [place] [address] since your last visit? Include any pap smears or colon screening. No     Patient stated her BP tends to run low. Patient is asymptomatic. Patient had an [address] done earlier and hadn't drank fluids prior to test, however, did drink some after. Patient given a bottle of water while in office.

## 2022-02-22 ENCOUNTER — HOSPITAL ENCOUNTER (OUTPATIENT)
Dept: MAMMOGRAPHY | Age: 62
Discharge: HOME | End: 2022-02-22
Attending: INTERNAL MEDICINE
Payer: COMMERCIAL

## 2022-02-22 DIAGNOSIS — Z78.0 POSTMENOPAUSAL: ICD-10-CM

## 2022-02-22 DIAGNOSIS — M85.89 OSTEOPENIA OF MULTIPLE SITES: ICD-10-CM

## 2022-02-22 PROCEDURE — 77080 DXA BONE DENSITY AXIAL: CPT

## 2022-03-18 PROBLEM — H40.013 OPEN ANGLE WITH BORDERLINE FINDINGS AND LOW GLAUCOMA RISK IN BOTH EYES: Status: ACTIVE | Noted: 2017-05-01

## 2022-03-19 PROBLEM — H43.393 VITREOUS FLOATERS OF BOTH EYES: Status: ACTIVE | Noted: 2018-01-15

## 2022-03-19 PROBLEM — R10.32 BILATERAL GROIN PAIN: Status: ACTIVE | Noted: 2022-02-14

## 2022-03-19 PROBLEM — R10.31 BILATERAL GROIN PAIN: Status: ACTIVE | Noted: 2022-02-14

## 2022-03-19 PROBLEM — H04.129 TEAR FILM INSUFFICIENCY: Status: ACTIVE | Noted: 2017-05-01

## 2022-03-20 PROBLEM — H01.009 BLEPHARITIS: Status: ACTIVE | Noted: 2017-05-01

## 2022-03-29 ENCOUNTER — TELEPHONE (OUTPATIENT)
Dept: INTERNAL MEDICINE CLINIC | Age: 62
End: 2022-03-29

## 2022-03-29 DIAGNOSIS — Z13.820 SCREENING FOR OSTEOPOROSIS: Primary | ICD-10-CM

## 2022-03-29 NOTE — LETTER
3/29/2022 8:32 AM    Ms. Anish Chavez  [address] [address]      Dear Ms. Lionel Jonas:    [facility] missed you! Please call our office at [phone] and schedule a follow up appointment for your continued care.         Sincerely,      Mindi Mccarthy MD

## 2022-03-29 NOTE — TELEPHONE ENCOUNTER
----- Message from Cely Orona sent at 3/29/2022  7:23 AM EDT -----  Regarding: FW: info needed for billing problem/Fatou    ----- Message -----  From: Cally Patel  Sent: 3/28/2022   1:32 PM EDT  To: Shila Heller Pool  Subject: info needed for billing problem/Fatou        Tawny Tarango, following up from our conversation on 3/16/22. I spoke with billing ([phone]) today. Maria Eugenia Diana said I need the doctor's office to  send by fax ([phone]) a corrected diagnosis code  for preventative bone density test that Dr. Argelia Polk ordered following my wellness checkup on 2-8-22. Please provide the following information on COMPANY LETTERHEAD with  my name Suraj Magallanes),  SSN ([id number]), date of service (2-22-22) and hospital ([address] [place] [address])to the above fax number. Please keep me updated on progress with this problem.    Thanks, Ankit Boggs  ([phone])

## 2022-03-29 NOTE — LETTER
3/29/2022 8:32 AM    Ms.  Francisca Zapata  [address] [address]              Sincerely,      Farhad Stevenson MD

## 2022-03-29 NOTE — LETTER
3/29/2022 8:26 AM    Ms. Catia Nevarez 84 [phone]   SSN [id number]      To whom it may concern:    Please correct the diagnosis code for the preventative bone density test ordered during Ms. Deutsch Screws wellness checkup on 2/8/2022 to Z13.820 and Z00.00. This was completed 2/22/22 at [address] Please contact me if you have any questions.         Sincerely,      Jose Angel Gan MD

## 2022-04-27 ENCOUNTER — OFFICE VISIT (OUTPATIENT)
Dept: INTERNAL MEDICINE CLINIC | Age: 62
End: 2022-04-27
Payer: COMMERCIAL

## 2022-04-27 VITALS
BODY MASS INDEX: 23.32 KG/M2 | WEIGHT: 136.6 LBS | SYSTOLIC BLOOD PRESSURE: 99 MMHG | TEMPERATURE: 98.3 F | OXYGEN SATURATION: 97 % | HEART RATE: 59 BPM | RESPIRATION RATE: 14 BRPM | DIASTOLIC BLOOD PRESSURE: 54 MMHG | HEIGHT: 64 IN

## 2022-04-27 DIAGNOSIS — R39.9 UTI SYMPTOMS: ICD-10-CM

## 2022-04-27 DIAGNOSIS — N30.01 ACUTE CYSTITIS WITH HEMATURIA: Primary | ICD-10-CM

## 2022-04-27 DIAGNOSIS — B00.2 ORAL HERPES SIMPLEX INFECTION: ICD-10-CM

## 2022-04-27 LAB
BILIRUB UR QL STRIP: NEGATIVE
GLUCOSE UR-MCNC: NEGATIVE MG/DL
KETONES P FAST UR STRIP-MCNC: NEGATIVE MG/DL
PH UR STRIP: 5.5 [PH] (ref 4.6–8)
PROT UR QL STRIP: NORMAL
SP GR UR STRIP: 1.02 (ref 1–1.03)
UA UROBILINOGEN AMB POC: NORMAL (ref 0.2–1)
URINALYSIS CLARITY POC: CLEAR
URINALYSIS COLOR POC: YELLOW
URINE BLOOD POC: NORMAL
URINE LEUKOCYTES POC: NORMAL
URINE NITRITES POC: NEGATIVE

## 2022-04-27 PROCEDURE — 99214 OFFICE O/P EST MOD 30 MIN: CPT | Performed by: PHYSICIAN ASSISTANT

## 2022-04-27 PROCEDURE — 81002 URINALYSIS NONAUTO W/O SCOPE: CPT | Performed by: PHYSICIAN ASSISTANT

## 2022-04-27 RX ORDER — NITROFURANTOIN 25; 75 MG/1; MG/1
100 CAPSULE ORAL 2 TIMES DAILY
Qty: 14 CAPSULE | Refills: 0 | Status: SHIPPED | OUTPATIENT
Start: 2022-04-27 | End: 2022-05-04

## 2022-04-27 RX ORDER — VALACYCLOVIR HYDROCHLORIDE 1 G/1
1000 TABLET, FILM COATED ORAL DAILY
Qty: 90 TABLET | Refills: 3 | Status: SHIPPED | OUTPATIENT
Start: 2022-04-27

## 2022-04-27 NOTE — PROGRESS NOTES
Shelly Eddy is a 60 y.o. female  Chief Complaint   Patient presents with    Bladder Infection     pt states that her symptoms have started since Sunday, discomfort, urgency, burning, hematuria      Visit Vitals  BP (!) 99/54 (BP 1 Location: Left upper arm, BP Patient Position: Sitting, BP Cuff Size: Adult)   Pulse (!) 59   Temp 98.3 °F (36.8 °C) (Temporal)   Resp 14   Ht 5' 4\" (1.626 m)   Wt 136 lb 9.6 oz (62 kg)   SpO2 97%   BMI 23.45 kg/m²      Health Maintenance Due   Topic Date Due    Shingrix Vaccine Age 49> (2 of 2) 04/19/2019       HPI  Belia Douglas MD's patient in to see me today for an acute visit. UTI sx x 4 days - pan, urg, freq, bright red blood. 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 ROS  Review of Systems   Constitutional: Negative for fever. Gastrointestinal: Negative for abdominal pain. Genitourinary: Positive for dysuria, frequency and urgency. Negative for flank pain and hematuria. Denies vaginal discharge. Psychiatric/Behavioral: Negative for depression. The patient is not nervous/anxious. EXAM  Physical Exam  Vitals and nursing note reviewed. Constitutional:       General: She is not in acute distress. Appearance: She is well-developed. HENT:      Head: Normocephalic and atraumatic. Neck:      Vascular: No JVD. Cardiovascular:      Rate and Rhythm: Normal rate and regular rhythm. Heart sounds: Normal heart sounds. Pulmonary:      Effort: Pulmonary effort is normal. No respiratory distress. Breath sounds: Normal breath sounds. Genitourinary:     Comments: Bilateral costovertebral angles nontender to palpation. Musculoskeletal:      Cervical back: Neck supple. Skin:     General: Skin is warm and dry. Neurological:      Mental Status: She is alert and oriented to person, place, and time.    Psychiatric:         Behavior: Behavior normal.         Thought Content: Thought content normal.         Judgment: Judgment normal.       ASSESSMENT/PLAN  Encounter Diagnoses     ICD-10-CM ICD-9-CM   1. Acute cystitis with hematuria  N30.01 595.0   2. UTI symptoms  R39.9 788.99   3.  Oral herpes simplex infection  B00.2 054.2     Orders Placed This Encounter    CULTURE, URINE    AMB POC URINALYSIS DIP STICK MANUAL W/O MICRO    nitrofurantoin, macrocrystal-monohydrate, (Macrobid) 100 mg capsule    Recommended change to daily dosing: valACYclovir (VALTREX) 1 gram tablet

## 2022-04-30 LAB
BACTERIA UR CULT: NORMAL
SPECIMEN STATUS REPORT, ROLRST: NORMAL

## 2022-06-23 ENCOUNTER — APPOINTMENT (OUTPATIENT)
Dept: ULTRASOUND IMAGING | Age: 62
End: 2022-06-23
Attending: EMERGENCY MEDICINE
Payer: COMMERCIAL

## 2022-06-23 ENCOUNTER — APPOINTMENT (OUTPATIENT)
Dept: GENERAL RADIOLOGY | Age: 62
End: 2022-06-23
Attending: EMERGENCY MEDICINE
Payer: COMMERCIAL

## 2022-06-23 ENCOUNTER — HOSPITAL ENCOUNTER (EMERGENCY)
Age: 62
Discharge: HOME | End: 2022-06-23
Attending: EMERGENCY MEDICINE
Payer: COMMERCIAL

## 2022-06-23 VITALS
WEIGHT: 136.02 LBS | OXYGEN SATURATION: 98 % | RESPIRATION RATE: 16 BRPM | TEMPERATURE: 98.3 F | BODY MASS INDEX: 23.22 KG/M2 | SYSTOLIC BLOOD PRESSURE: 136 MMHG | DIASTOLIC BLOOD PRESSURE: 77 MMHG | HEART RATE: 61 BPM | HEIGHT: 64 IN

## 2022-06-23 DIAGNOSIS — M25.471 RIGHT ANKLE SWELLING: Primary | ICD-10-CM

## 2022-06-23 PROCEDURE — 73660 X-RAY EXAM OF TOE(S): CPT

## 2022-06-23 PROCEDURE — 93971 EXTREMITY STUDY: CPT

## 2022-06-23 PROCEDURE — 99284 EMERGENCY DEPT VISIT MOD MDM: CPT

## 2022-06-23 RX ORDER — CEPHALEXIN 500 MG/1
500 CAPSULE ORAL 4 TIMES DAILY
Qty: 28 CAPSULE | Refills: 0 | Status: SHIPPED | OUTPATIENT
Start: 2022-06-23 | End: 2022-06-30

## 2022-06-24 NOTE — ED PROVIDER NOTES
Date of Service:  6/23/2022    Patient:  Irl Krabbe    Chief Complaint:  Leg Swelling       HPI:  Irl Krabbe is a 60 y.o.  female who presents for evaluation of leg swelling. About 7 days ago, patient cut her great toe on the right foot with a lawnmower. She did not seek any immediate medical attention but was on antibiotics about a week later. Its been a nonissue. She went to [facility] recently and about 3 or 4 days after arriving she began to have swelling in her right lower calf and ankle. She is back now continues to have worsening swelling. 1 out of 10 discomfort. She does note that she did a lot of ambulating while on her trip. No history of blood clots.   She denies other acute complaints or modifying factors           Past Medical History:   Diagnosis Date    Anemia self    Arthritis     Bilateral groin pain 2/14/2022    Nausea & vomiting        Past Surgical History:   Procedure Laterality Date    ENDOSCOPY, COLON, DIAGNOSTIC  5/2009    [facility])    HX COLONOSCOPY  2/25/2019    HX DILATION AND CURETTAGE  1993    HX GYN  2007    ABLATION (+bladder sling)    HX HEENT  1993    SINUS    HX HEENT  2005    LASIK    HX ORTHOPAEDIC  2004    R HIP    HX UROLOGICAL  2007    IMPLANT BREAST SILICONE/EQ  5261    [place] BREAST SURGERY PROCEDURE UNLISTED  1996    IMPLANTS         Family History:   Problem Relation Age of Onset    Cancer Mother     Arthritis-rheumatoid Mother     Lung Disease Mother         pulm fibrosis    Macular Degen Father     Lung Disease Maternal Uncle         pulm fibrosis    Lung Disease Maternal Grandmother         pulm fibrosis    Cancer Maternal Aunt     Lung Disease Maternal Uncle        Social History     Socioeconomic History    Marital status:      Spouse name: Not on file    Number of children: Not on file    Years of education: Not on file    Highest education level: Not on file   Occupational History    Not on file   Tobacco Use    Smoking status: Never Smoker    Smokeless tobacco: Never Used   Vaping Use    Vaping Use: Never used   Substance and Sexual Activity    Alcohol use: Yes     Alcohol/week: 2.0 standard drinks     Types: 2 Standard drinks or equivalent per week     Comment: OCCASIONAL    Drug use: Never    Sexual activity: Yes     Partners: Male     Birth control/protection: Surgical   Other Topics Concern    Not on file   Social History Narrative    Not on file     Social Determinants of Health     Financial Resource Strain:     Difficulty of Paying Living Expenses: Not on file   Food Insecurity:     Worried About Running Out of Food in the Last Year: Not on file    Mary Grace of Food in the Last Year: Not on file   Transportation Needs:     Lack of Transportation (Medical): Not on file    Lack of Transportation (Non-Medical): Not on file   Physical Activity:     Days of Exercise per Week: Not on file    Minutes of Exercise per Session: Not on file   Stress:     Feeling of Stress : Not on file   Social Connections:     Frequency of Communication with Friends and Family: Not on file    Frequency of Social Gatherings with Friends and Family: Not on file    Attends [employer] Services: Not on file    Active Member of [address] [place] [address] [employer] or Organizations: Not on file    Attends Club or Organization Meetings: Not on file    Marital Status: Not on file   Intimate Partner Violence:     Fear of Current or Ex-Partner: Not on file    Emotionally Abused: Not on file    Physically Abused: Not on file    Sexually Abused: Not on file   Housing Stability:     Unable to Pay for Housing in the Last Year: Not on file    Number of [place] in the Last Year: Not on file    Unstable Housing in the Last Year: Not on file         ALLERGIES: Adhesive, Codeine, and Resinol [[email or web address]]    Review of Systems   Cardiovascular: Positive for leg swelling. Skin: Positive for color change.    All other systems reviewed and are negative. Vitals:    06/23/22 2050   BP: 136/77   Pulse: 61   Resp: 16   Temp: 98.3 °F (36.8 °C)   SpO2: 98%   Weight: 61.7 kg (136 lb 0.4 oz)   Height: 5' 4\" (1.626 m)            Physical Exam  Vitals and nursing note reviewed. Constitutional:       Appearance: Normal appearance. HENT:      Head: Normocephalic and atraumatic. Mouth/Throat:      Mouth: Mucous membranes are moist.   Eyes:      General: No scleral icterus. Cardiovascular:      Rate and Rhythm: Normal rate. Pulmonary:      Effort: Pulmonary effort is normal.   Abdominal:      General: Abdomen is flat. Musculoskeletal:         General: Swelling (right lower calf) and tenderness present. No deformity. Skin:     General: Skin is warm. Capillary Refill: Capillary refill takes less than 2 seconds. Comments: Disrupted nail, healing as well as erythematous great toe on the right with minimal tracking up to the midfoot   Neurological:      Mental Status: She is alert and oriented to person, place, and time. Psychiatric:         Mood and Affect: Mood normal.          MDM        VITAL SIGNS:  Patient Vitals for the past 4 hrs:   Temp Pulse Resp BP SpO2   06/23/22 2050 98.3 °F (36.8 °C) 61 16 136/77 98 %         LABS:  No results found for this or any previous visit (from the past 6 hour(s)). IMAGING:  DUPLEX LOWER EXT VENOUS RIGHT   Final Result      XR GREAT TOE RT MIN 2 V   Final Result   No acute abnormality. Medications During Visit:  Medications - No data to display      DECISION MAKING:  Anita Rodriguez is a 60 y.o. female who comes in as above. Well-appearing female. No DVT on ultrasound. Great toe does not show any signs of osteomyelitis. Will place on Keflex given the discoloration of the toe and concern for possible cellulitis. Follow-up with PCP and return as needed. As for cause of her swelling, may be related to overuse from walking while on her vacation.   No definitive sign of injury      IMPRESSION:  1. Right ankle swelling        DISPOSITION:  Discharged      Current Discharge Medication List      START taking these medications    Details   cephALEXin (Keflex) 500 mg capsule Take 1 Capsule by mouth four (4) times daily for 7 days. Qty: 28 Capsule, Refills: 0  Start date: 6/23/2022, End date: 6/30/2022              Follow-up Information     Follow up With Specialties Details Why Contact Info    Josse Dejesus MD Internal Medicine Physician Schedule an appointment as soon as possible for a visit   [address]  [phone]              The patient is asked to follow-up with their primary care provider in the next several days. They are to call tomorrow for an appointment. The patient is asked to return promptly for any increased concerns or worsening of symptoms. They can return to this emergency department or any other emergency department.       Procedures

## 2022-11-17 ENCOUNTER — TRANSCRIBE ORDER (OUTPATIENT)
Dept: SCHEDULING | Age: 62
End: 2022-11-17

## 2022-11-17 DIAGNOSIS — Z12.31 SCREENING MAMMOGRAM FOR HIGH-RISK PATIENT: Primary | ICD-10-CM

## 2023-01-25 ENCOUNTER — TELEPHONE (OUTPATIENT)
Dept: INTERNAL MEDICINE CLINIC | Age: 63
End: 2023-01-25

## 2023-02-13 ENCOUNTER — HOSPITAL ENCOUNTER (OUTPATIENT)
Dept: MAMMOGRAPHY | Age: 63
Discharge: HOME | End: 2023-02-13
Attending: INTERNAL MEDICINE
Payer: COMMERCIAL

## 2023-02-13 DIAGNOSIS — Z12.31 SCREENING MAMMOGRAM FOR HIGH-RISK PATIENT: ICD-10-CM

## 2023-02-13 PROCEDURE — 77067 SCR MAMMO BI INCL CAD: CPT

## 2023-05-08 ENCOUNTER — OFFICE VISIT (OUTPATIENT)
Age: 63
End: 2023-05-08
Payer: COMMERCIAL

## 2023-05-08 VITALS
OXYGEN SATURATION: 96 % | RESPIRATION RATE: 20 BRPM | HEART RATE: 85 BPM | DIASTOLIC BLOOD PRESSURE: 52 MMHG | BODY MASS INDEX: 22.33 KG/M2 | WEIGHT: 130.8 LBS | HEIGHT: 64 IN | TEMPERATURE: 98.5 F | SYSTOLIC BLOOD PRESSURE: 92 MMHG

## 2023-05-08 DIAGNOSIS — Z00.00 ROUTINE GENERAL MEDICAL EXAMINATION AT A HEALTH CARE FACILITY: ICD-10-CM

## 2023-05-08 DIAGNOSIS — Z00.00 ROUTINE GENERAL MEDICAL EXAMINATION AT A HEALTH CARE FACILITY: Primary | ICD-10-CM

## 2023-05-08 DIAGNOSIS — M54.50 ACUTE BILATERAL LOW BACK PAIN WITHOUT SCIATICA: ICD-10-CM

## 2023-05-08 LAB
BASOPHILS # BLD AUTO: 0 X10E3/UL (ref 0–0.2)
BASOPHILS NFR BLD AUTO: 1 %
EOSINOPHIL # BLD AUTO: 0.1 X10E3/UL (ref 0–0.4)
EOSINOPHIL NFR BLD AUTO: 2 %
ERYTHROCYTE [DISTWIDTH] IN BLOOD BY AUTOMATED COUNT: 11.9 % (ref 11.7–15.4)
HCT VFR BLD AUTO: 40.6 % (ref 34–46.6)
HGB BLD-MCNC: 14 G/DL (ref 11.1–15.9)
IMM GRANULOCYTES # BLD AUTO: 0 X10E3/UL (ref 0–0.1)
IMM GRANULOCYTES NFR BLD AUTO: 0 %
LYMPHOCYTES # BLD AUTO: 0.5 X10E3/UL (ref 0.7–3.1)
LYMPHOCYTES NFR BLD AUTO: 9 %
MCH RBC QN AUTO: 32.9 PG (ref 26.6–33)
MCHC RBC AUTO-ENTMCNC: 34.5 G/DL (ref 31.5–35.7)
MCV RBC AUTO: 96 FL (ref 79–97)
MONOCYTES # BLD AUTO: 0.5 X10E3/UL (ref 0.1–0.9)
MONOCYTES NFR BLD AUTO: 8 %
NEUTROPHILS # BLD AUTO: 4.5 X10E3/UL (ref 1.4–7)
NEUTROPHILS NFR BLD AUTO: 80 %
PLATELET # BLD AUTO: 175 X10E3/UL (ref 150–450)
RBC # BLD AUTO: 4.25 X10E6/UL (ref 3.77–5.28)
WBC # BLD AUTO: 5.6 X10E3/UL (ref 3.4–10.8)

## 2023-05-08 PROCEDURE — 99396 PREV VISIT EST AGE 40-64: CPT | Performed by: INTERNAL MEDICINE

## 2023-05-08 SDOH — ECONOMIC STABILITY: INCOME INSECURITY: HOW HARD IS IT FOR YOU TO PAY FOR THE VERY BASICS LIKE FOOD, HOUSING, MEDICAL CARE, AND HEATING?: NOT HARD AT ALL

## 2023-05-08 SDOH — ECONOMIC STABILITY: HOUSING INSECURITY
IN THE LAST 12 MONTHS, WAS THERE A TIME WHEN YOU DID NOT HAVE A STEADY PLACE TO SLEEP OR SLEPT IN A SHELTER (INCLUDING NOW)?: NO

## 2023-05-08 SDOH — ECONOMIC STABILITY: FOOD INSECURITY: WITHIN THE PAST 12 MONTHS, THE FOOD YOU BOUGHT JUST DIDN'T LAST AND YOU DIDN'T HAVE MONEY TO GET MORE.: NEVER TRUE

## 2023-05-08 SDOH — ECONOMIC STABILITY: FOOD INSECURITY: WITHIN THE PAST 12 MONTHS, YOU WORRIED THAT YOUR FOOD WOULD RUN OUT BEFORE YOU GOT MONEY TO BUY MORE.: NEVER TRUE

## 2023-05-08 ASSESSMENT — PATIENT HEALTH QUESTIONNAIRE - PHQ9
SUM OF ALL RESPONSES TO PHQ9 QUESTIONS 1 & 2: 0
1. LITTLE INTEREST OR PLEASURE IN DOING THINGS: 0
SUM OF ALL RESPONSES TO PHQ QUESTIONS 1-9: 0
2. FEELING DOWN, DEPRESSED OR HOPELESS: 0

## 2023-05-08 NOTE — PROGRESS NOTES
Patient is here today for physical exam.  No chief complaint on file. [unfilled]    Current Outpatient Medications on File Prior to Visit   Medication Sig Dispense Refill    valACYclovir (VALTREX) 1 g tablet Take by mouth daily       No current facility-administered medications on file prior to visit. Health Maintenance Due   Topic    HIV screen     Shingles vaccine (2 of 2)    COVID-19 Vaccine (4 - Booster for [employer] series)    Depression Screen        1. \"Have you been to the ER, urgent care clinic since your last visit? Hospitalized since your last visit? \" No    2. \"Have you seen or consulted any other health care providers outside of the [address] [place] [address] since your last visit? \" No    3. For patients aged 39-70: Has the patient had a colonoscopy / FIT/ Cologuard? Yes      If the patient is female:    4. For patients aged 41-77: Has the patient had a mammogram within the past 2 years? Yes 2/2023 at [facility]      5. For patients aged 21-65: Has the patient had a pap smear?  Yes
about Calcium and Vit D. Sister had melanoma removed last week. .  Pain bilat buttocks. Fine walking and during the day - worse at night. Getting massages once a month. Talked with PT nd doing stretches. Today worked in yard and spring cleaning, now with pain today. Pain in bilat back and buttocks. Can occ go down deeper in buttocks, a handful of times has radiated to legs. HM_   Due pap August  UTD Shingrix - [address]. Review of Systems  Constitutional: negative  Eyes: negative except for loss of close vision  Ears, nose, mouth, throat, and face: negative except for mild old left hearing loss. Respiratory: negative, in study for pulmonary fibrosis , mild cough lingering post cold. Cardiovascular: negative except for mild swelling in ankles, resolves overnight. Gastrointestinal: negative except for tends towards constipation  Genitourinary:negative except for mild incontinence  Integument/breast: negative  Hematologic/lymphatic: negative  Musculoskeletal:negative except for buttocks and back pain as above, left foot beginnings of bunions. Right toe damaged in past, has nail growing in \"wrong\"   Neurological: negative  Behavioral/Psych: negative  Endocrine: negative  Allergic/Immunologic: negative    Objective:   Blood pressure (!) 92/52, pulse 85, temperature 98.5 °F (36.9 °C), temperature source Temporal, resp. rate 20, height 5' 4\" (1.626 m), weight 130 lb 12.8 oz (59.3 kg), SpO2 96 %.    Physical Examination:   General appearance - alert, well appearing, and in no distress  Mental status - alert, oriented to person, place, and time  Eyes - pupils equal and reactive, extraocular eye movements intact  Ears - bilateral TM's and external ear canals normal  Nose - normal and patent, no erythema, discharge or polyps  Mouth - mucous membranes moist, pharynx normal without lesions  Neck - supple, no significant adenopathy, carotids upstroke normal bilaterally, no bruits, thyroid exam:

## 2023-05-09 LAB
25(OH)D3+25(OH)D2 SERPL-MCNC: 32.1 NG/ML (ref 30–100)
ALBUMIN SERPL-MCNC: 4.5 G/DL (ref 3.8–4.8)
ALBUMIN/GLOB SERPL: 1.6 {RATIO} (ref 1.2–2.2)
ALP SERPL-CCNC: 93 IU/L (ref 44–121)
ALT SERPL-CCNC: 15 IU/L (ref 0–32)
AST SERPL-CCNC: 22 IU/L (ref 0–40)
BILIRUB SERPL-MCNC: 0.5 MG/DL (ref 0–1.2)
BUN SERPL-MCNC: 13 MG/DL (ref 8–27)
BUN/CREAT SERPL: 17 (ref 12–28)
CALCIUM SERPL-MCNC: 9.3 MG/DL (ref 8.7–10.3)
CHLORIDE SERPL-SCNC: 99 MMOL/L (ref 96–106)
CHOLEST SERPL-MCNC: 201 MG/DL (ref 100–199)
CO2 SERPL-SCNC: 25 MMOL/L (ref 20–29)
CREAT SERPL-MCNC: 0.77 MG/DL (ref 0.57–1)
EGFRCR SERPLBLD CKD-EPI 2021: 86 ML/MIN/1.73
GLOBULIN SER CALC-MCNC: 2.8 G/DL (ref 1.5–4.5)
GLUCOSE SERPL-MCNC: 90 MG/DL (ref 70–99)
HDLC SERPL-MCNC: 104 MG/DL
IMP & REVIEW OF LAB RESULTS: NORMAL
LDLC SERPL CALC-MCNC: 88 MG/DL (ref 0–99)
POTASSIUM SERPL-SCNC: 4.2 MMOL/L (ref 3.5–5.2)
PROT SERPL-MCNC: 7.3 G/DL (ref 6–8.5)
SODIUM SERPL-SCNC: 138 MMOL/L (ref 134–144)
TRIGL SERPL-MCNC: 50 MG/DL (ref 0–149)
TSH SERPL DL<=0.005 MIU/L-ACNC: 0.95 UIU/ML (ref 0.45–4.5)
VLDLC SERPL CALC-MCNC: 9 MG/DL (ref 5–40)

## 2023-05-31 ENCOUNTER — OFFICE VISIT (OUTPATIENT)
Age: 63
End: 2023-05-31
Payer: COMMERCIAL

## 2023-05-31 VITALS
OXYGEN SATURATION: 97 % | HEIGHT: 64 IN | WEIGHT: 133.2 LBS | BODY MASS INDEX: 22.74 KG/M2 | TEMPERATURE: 98.2 F | DIASTOLIC BLOOD PRESSURE: 47 MMHG | RESPIRATION RATE: 20 BRPM | HEART RATE: 63 BPM | SYSTOLIC BLOOD PRESSURE: 84 MMHG

## 2023-05-31 DIAGNOSIS — R31.0 GROSS HEMATURIA: ICD-10-CM

## 2023-05-31 DIAGNOSIS — R30.0 DYSURIA: Primary | ICD-10-CM

## 2023-05-31 LAB
BILIRUBIN, URINE, POC: NEGATIVE
BLOOD URINE, POC: NEGATIVE
GLUCOSE URINE, POC: NEGATIVE
KETONES, URINE, POC: NEGATIVE
LEUKOCYTE ESTERASE, URINE, POC: NEGATIVE
NITRITE, URINE, POC: NEGATIVE
PH, URINE, POC: 6 (ref 4.6–8)
PROTEIN,URINE, POC: NEGATIVE
SPECIFIC GRAVITY, URINE, POC: 1.01 (ref 1–1.03)
URINALYSIS CLARITY, POC: CLEAR
URINALYSIS COLOR, POC: YELLOW
UROBILINOGEN, POC: NORMAL

## 2023-05-31 PROCEDURE — 99213 OFFICE O/P EST LOW 20 MIN: CPT | Performed by: INTERNAL MEDICINE

## 2023-05-31 PROCEDURE — 81003 URINALYSIS AUTO W/O SCOPE: CPT | Performed by: INTERNAL MEDICINE

## 2023-05-31 SDOH — ECONOMIC STABILITY: FOOD INSECURITY: WITHIN THE PAST 12 MONTHS, YOU WORRIED THAT YOUR FOOD WOULD RUN OUT BEFORE YOU GOT MONEY TO BUY MORE.: NEVER TRUE

## 2023-05-31 SDOH — ECONOMIC STABILITY: INCOME INSECURITY: HOW HARD IS IT FOR YOU TO PAY FOR THE VERY BASICS LIKE FOOD, HOUSING, MEDICAL CARE, AND HEATING?: NOT HARD AT ALL

## 2023-05-31 SDOH — ECONOMIC STABILITY: FOOD INSECURITY: WITHIN THE PAST 12 MONTHS, THE FOOD YOU BOUGHT JUST DIDN'T LAST AND YOU DIDN'T HAVE MONEY TO GET MORE.: NEVER TRUE

## 2023-05-31 ASSESSMENT — PATIENT HEALTH QUESTIONNAIRE - PHQ9
1. LITTLE INTEREST OR PLEASURE IN DOING THINGS: 0
2. FEELING DOWN, DEPRESSED OR HOPELESS: 0
SUM OF ALL RESPONSES TO PHQ QUESTIONS 1-9: 0
SUM OF ALL RESPONSES TO PHQ9 QUESTIONS 1 & 2: 0
SUM OF ALL RESPONSES TO PHQ QUESTIONS 1-9: 0

## 2023-05-31 NOTE — PROGRESS NOTES
Subjective:     Mack Murillo is a 58 y.o. female who complains of dysuria, frequency, urgency for 10 days. Hematuria - slight, x 1 day. Also yesterday with increased frequency and dysuria. Feels better today. Patient denies fever, pelvic discomfort, n/v. Patient does not have a history of recurrent UTI. Patient does have a history of pyelonephritis. Current Outpatient Medications   Medication Sig Dispense Refill    valACYclovir (VALTREX) 1 g tablet Take by mouth daily       No current facility-administered medications for this visit. Review of Systems  Pertinent items are noted in HPI. Objective:     BP (!) 84/47 (Site: Right Upper Arm, Position: Sitting, Cuff Size: Medium Adult)   Pulse 63   Temp 98.2 °F (36.8 °C) (Temporal)   Resp 20   Ht 5' 4\" (1.626 m)   Wt 133 lb 3.2 oz (60.4 kg)   SpO2 97%   BMI 22.86 kg/m²   General:  alert, cooperative, and no distress   Abdomen: soft, nontender, nondistended, no masses or organomegaly. Back:  CVA tenderness absent   :  defer exam     Laboratory:   Urine dipstick shows negative for all components. Assessment/Plan:     Val Bland was seen today for urinary tract infection. Diagnoses and all orders for this visit:    Dysuria  -     AMB POC URINALYSIS DIP STICK AUTO W/O MICRO  -     Cancel: Culture, Urine; Future  -     Cancel: Culture, Urine  -     Culture, Urine; Future    Gross hematuria  -     AMB POC URINALYSIS DIP STICK AUTO W/O MICRO  -     Cancel: Culture, Urine; Future  -     Cancel: Culture, Urine  -     Culture, Urine; Future     . No sign of UTI. ? Small stone, ? Atrophic vaginitis. Will send culture. Increased fluids. Call if recurs.

## 2023-05-31 NOTE — PROGRESS NOTES
Patient is here for UTI symptoms. Chief Complaint   Patient presents with    Urinary Tract Infection          [unfilled]    Current Outpatient Medications on File Prior to Visit   Medication Sig Dispense Refill    valACYclovir (VALTREX) 1 g tablet Take by mouth daily       No current facility-administered medications on file prior to visit. Health Maintenance Due   Topic    HIV screen     COVID-19 Vaccine (4 - Booster for [employer] series)       1. \"Have you been to the ER, urgent care clinic since your last visit? Hospitalized since your last visit? \" No    2. \"Have you seen or consulted any other health care providers outside of the [address] [place] [address] since your last visit? \" No     3. For patients aged 39-70: Has the patient had a colonoscopy / FIT/ Cologuard? Yes      If the patient is female:    4. For patients aged 41-77: Has the patient had a mammogram within the past 2 years? Yes      5. For patients aged 21-65: Has the patient had a pap smear?  Yes

## 2023-06-03 LAB — BACTERIA UR CULT: NORMAL

## 2023-08-14 ENCOUNTER — OFFICE VISIT (OUTPATIENT)
Age: 63
End: 2023-08-14
Payer: COMMERCIAL

## 2023-08-14 VITALS
BODY MASS INDEX: 23.07 KG/M2 | OXYGEN SATURATION: 98 % | WEIGHT: 134.4 LBS | DIASTOLIC BLOOD PRESSURE: 50 MMHG | HEART RATE: 63 BPM | TEMPERATURE: 98 F | SYSTOLIC BLOOD PRESSURE: 85 MMHG | RESPIRATION RATE: 16 BRPM

## 2023-08-14 DIAGNOSIS — Z12.4 SCREENING FOR MALIGNANT NEOPLASM OF CERVIX: Primary | ICD-10-CM

## 2023-08-14 PROCEDURE — G0101 CA SCREEN;PELVIC/BREAST EXAM: HCPCS | Performed by: INTERNAL MEDICINE

## 2023-08-14 NOTE — PATIENT INSTRUCTIONS
Mucinex 1200mg twice a day (plain not D or DM). Afrin  - 1 dose approx 12 hours prion and 1 dose prior to flying.

## 2023-08-24 ENCOUNTER — TELEPHONE (OUTPATIENT)
Age: 63
End: 2023-08-24

## 2023-08-28 LAB
CYTOLOGIST CVX/VAG CYTO: NORMAL
CYTOLOGY CVX/VAG DOC CYTO: NORMAL
DX ICD CODE: NORMAL
OTHER STN SPEC: NORMAL
STAT OF ADQ CVX/VAG CYTO-IMP: NORMAL

## 2023-09-26 ENCOUNTER — TELEPHONE (OUTPATIENT)
Age: 63
End: 2023-09-26

## 2023-09-26 NOTE — TELEPHONE ENCOUNTER
LM for patient to repeat pap due to  vial.  There will be no charge for this appointment. Apoligized to patient.

## 2023-10-12 ENCOUNTER — OFFICE VISIT (OUTPATIENT)
Age: 63
End: 2023-10-12
Payer: COMMERCIAL

## 2023-10-12 VITALS
BODY MASS INDEX: 22.84 KG/M2 | DIASTOLIC BLOOD PRESSURE: 62 MMHG | RESPIRATION RATE: 16 BRPM | TEMPERATURE: 97 F | WEIGHT: 133.8 LBS | OXYGEN SATURATION: 98 % | HEART RATE: 62 BPM | HEIGHT: 64 IN | SYSTOLIC BLOOD PRESSURE: 99 MMHG

## 2023-10-12 DIAGNOSIS — Z23 NEEDS FLU SHOT: ICD-10-CM

## 2023-10-12 DIAGNOSIS — Z12.4 SCREENING FOR MALIGNANT NEOPLASM OF CERVIX: Primary | ICD-10-CM

## 2023-10-12 PROCEDURE — 90471 IMMUNIZATION ADMIN: CPT | Performed by: INTERNAL MEDICINE

## 2023-10-12 PROCEDURE — 90674 CCIIV4 VAC NO PRSV 0.5 ML IM: CPT | Performed by: INTERNAL MEDICINE

## 2023-10-12 NOTE — PROGRESS NOTES
1.\"Have you been to the ER, urgent care clinic since your last visit? Hospitalized since your last visit? \"   No     2. \"Have you seen or consulted any other health care providers outside of the [address] [place] [address] since your last visit? \"  No

## 2023-10-17 LAB
CYTOLOGIST CVX/VAG CYTO: NORMAL
CYTOLOGY CVX/VAG DOC CYTO: NORMAL
CYTOLOGY CVX/VAG DOC THIN PREP: NORMAL
DX ICD CODE: NORMAL
HPV GENOTYPE REFLEX: NORMAL
HPV I/H RISK 4 DNA CVX QL PROBE+SIG AMP: NEGATIVE
Lab: NORMAL
OTHER STN SPEC: NORMAL
STAT OF ADQ CVX/VAG CYTO-IMP: NORMAL

## 2023-11-08 ENCOUNTER — TELEPHONE (OUTPATIENT)
Age: 63
End: 2023-11-08

## 2023-11-09 ENCOUNTER — OFFICE VISIT (OUTPATIENT)
Age: 63
End: 2023-11-09

## 2023-11-09 VITALS
HEIGHT: 65 IN | TEMPERATURE: 98.6 F | DIASTOLIC BLOOD PRESSURE: 56 MMHG | WEIGHT: 137.6 LBS | BODY MASS INDEX: 22.92 KG/M2 | HEART RATE: 64 BPM | SYSTOLIC BLOOD PRESSURE: 101 MMHG | RESPIRATION RATE: 18 BRPM

## 2023-11-09 DIAGNOSIS — R05.8 POST-VIRAL COUGH SYNDROME: Primary | ICD-10-CM

## 2023-11-09 DIAGNOSIS — R05.9 COUGH IN ADULT: ICD-10-CM

## 2023-11-09 LAB
RSV, POC: NEGATIVE
VALID INTERNAL CONTROL: YES

## 2023-11-09 RX ORDER — ALBUTEROL SULFATE 90 UG/1
2 AEROSOL, METERED RESPIRATORY (INHALATION) 4 TIMES DAILY PRN
Qty: 18 G | Refills: 0 | Status: SHIPPED | OUTPATIENT
Start: 2023-11-09

## 2023-11-09 RX ORDER — LORATADINE PSEUDOEPHEDRINE SULFATE 10; 240 MG/1; MG/1
1 TABLET, EXTENDED RELEASE ORAL DAILY
Qty: 7 TABLET | Refills: 0 | Status: SHIPPED | OUTPATIENT
Start: 2023-11-09 | End: 2023-11-16

## 2023-11-09 RX ORDER — BENZONATATE 200 MG/1
200 CAPSULE ORAL 3 TIMES DAILY
Qty: 21 CAPSULE | Refills: 0 | Status: SHIPPED | OUTPATIENT
Start: 2023-11-09 | End: 2023-11-16

## 2023-11-09 RX ORDER — DEXTROMETHORPHAN HYDROBROMIDE AND PROMETHAZINE HYDROCHLORIDE 15; 6.25 MG/5ML; MG/5ML
5 SYRUP ORAL
Qty: 120 ML | Refills: 0 | Status: SHIPPED | OUTPATIENT
Start: 2023-11-09 | End: 2023-11-16

## 2023-11-09 NOTE — PROGRESS NOTES
Emily Saha (:  1960) is a 58 y.o. female,New patient, here for evaluation of the following chief complaint(s):  Cough (Pt arrived after being sent by PCP due to coughing spell (productive) and couldn't catch her breath. O2 stats in weigh in area (97%) w/ no signs of SOB or respiratory distress. Pt stated granddaughter was taken to ED for croup 10/23/23)      ASSESSMENT/PLAN:    1. Post-viral cough syndrome  - promethazine-dextromethorphan (PROMETHAZINE-DM) 6.25-15 MG/5ML syrup; Take 5 mLs by mouth nightly as needed for Cough  Dispense: 120 mL; Refill: 0  - benzonatate (TESSALON) 200 MG capsule; Take 1 capsule by mouth 3 times daily for 7 days  Dispense: 21 capsule; Refill: 0  - albuterol sulfate HFA (VENTOLIN HFA) 108 (90 Base) MCG/ACT inhaler; Inhale 2 puffs into the lungs 4 times daily as needed (cough)  Dispense: 18 g; Refill: 0  - loratadine-pseudoephedrine (CLARITIN-D 24 HOUR)  MG per extended release tablet; Take 1 tablet by mouth daily for 7 days  Dispense: 7 tablet; Refill: 0   - Discussed with patient like post viral recommended rest, push fluids, tylenol/ibuprofen as needed for pain that will aid with improvement of symptoms. If no improvement with symptoms advised to RTC. Patient in agreement with plan      2. Cough in adult  - AMB POC RSV     SUBJECTIVE/OBJECTIVE:  58 y.o. female presents with symptoms of Cough (Pt arrived after being sent by PCP due to coughing spell (productive) and couldn't catch her breath. O2 stats in weigh in area (97%) w/ no signs of SOB or respiratory distress. Pt stated granddaughter was taken to ED for croup 10/23/23)  Reports URI symptoms x several weeks (thinks about 6) and now has lingering productive cough, sore throat and right ear pain. Reports using OTC cold suppressants without improvement in symptoms. Reports she had an episode of coughing today that took her breath away although she reports recovery from t and denies SOB at this time.  Denies fevers,

## 2023-11-09 NOTE — PATIENT INSTRUCTIONS
- rest/ push fluids  - take medications as directed  - take OTC medication tylenol/ibuprofen for pain/discomfort as needed

## 2024-04-09 ENCOUNTER — HOSPITAL ENCOUNTER (OUTPATIENT)
Facility: HOSPITAL | Age: 64
Discharge: HOME | End: 2024-04-12
Attending: FAMILY MEDICINE
Payer: MEDICARE

## 2024-04-09 DIAGNOSIS — Z12.31 ENCOUNTER FOR SCREENING MAMMOGRAM FOR MALIGNANT NEOPLASM OF BREAST: ICD-10-CM

## 2024-04-09 DIAGNOSIS — Z12.39 ENCOUNTER FOR SCREENING FOR MALIGNANT NEOPLASM OF BREAST, UNSPECIFIED SCREENING MODALITY: ICD-10-CM

## 2024-04-09 PROCEDURE — 77067 SCR MAMMO BI INCL CAD: CPT

## 2024-06-05 ENCOUNTER — HOSPITAL ENCOUNTER (OUTPATIENT)
Facility: HOSPITAL | Age: 64
Discharge: HOME | End: 2024-06-08
Attending: FAMILY MEDICINE
Payer: MEDICARE

## 2024-06-05 DIAGNOSIS — M81.0 AGE-RELATED OSTEOPOROSIS WITHOUT CURRENT PATHOLOGICAL FRACTURE: ICD-10-CM

## 2024-06-05 PROCEDURE — 77080 DXA BONE DENSITY AXIAL: CPT

## 2025-04-14 ENCOUNTER — TRANSCRIBE ORDERS (OUTPATIENT)
Facility: HOSPITAL | Age: 65
End: 2025-04-14

## 2025-04-14 DIAGNOSIS — Z12.31 OTHER SCREENING MAMMOGRAM: Primary | ICD-10-CM

## 2025-05-25 ENCOUNTER — HOSPITAL ENCOUNTER (EMERGENCY)
Facility: HOSPITAL | Age: 65
Discharge: HOME | End: 2025-05-25
Attending: EMERGENCY MEDICINE
Payer: MEDICARE

## 2025-05-25 ENCOUNTER — APPOINTMENT (OUTPATIENT)
Facility: HOSPITAL | Age: 65
End: 2025-05-25
Payer: MEDICARE

## 2025-05-25 VITALS
HEIGHT: 64 IN | BODY MASS INDEX: 22.71 KG/M2 | SYSTOLIC BLOOD PRESSURE: 113 MMHG | RESPIRATION RATE: 18 BRPM | WEIGHT: 133 LBS | HEART RATE: 76 BPM | TEMPERATURE: 99.2 F | OXYGEN SATURATION: 93 % | DIASTOLIC BLOOD PRESSURE: 64 MMHG

## 2025-05-25 DIAGNOSIS — M79.18 CERVICAL MYOFASCIAL PAIN SYNDROME: ICD-10-CM

## 2025-05-25 DIAGNOSIS — R50.9 FEVER, UNSPECIFIED FEVER CAUSE: ICD-10-CM

## 2025-05-25 DIAGNOSIS — J18.9 PNEUMONIA OF LEFT LOWER LOBE DUE TO INFECTIOUS ORGANISM: Primary | ICD-10-CM

## 2025-05-25 LAB
ALBUMIN SERPL-MCNC: 2.7 G/DL (ref 3.5–5)
ALBUMIN/GLOB SERPL: 0.5 (ref 1.1–2.2)
ALP SERPL-CCNC: 97 U/L (ref 45–117)
ALT SERPL-CCNC: 25 U/L (ref 12–78)
ANION GAP SERPL CALC-SCNC: 5 MMOL/L (ref 2–12)
AST SERPL-CCNC: 21 U/L (ref 15–37)
BASOPHILS # BLD: 0.04 K/UL (ref 0–0.1)
BASOPHILS NFR BLD: 0.3 % (ref 0–1)
BILIRUB SERPL-MCNC: 0.4 MG/DL (ref 0.2–1)
BUN SERPL-MCNC: 9 MG/DL (ref 6–20)
BUN/CREAT SERPL: 9 (ref 12–20)
CALCIUM SERPL-MCNC: 8.6 MG/DL (ref 8.5–10.1)
CHLORIDE SERPL-SCNC: 105 MMOL/L (ref 97–108)
CO2 SERPL-SCNC: 27 MMOL/L (ref 21–32)
CREAT SERPL-MCNC: 0.96 MG/DL (ref 0.55–1.02)
DIFFERENTIAL METHOD BLD: ABNORMAL
EOSINOPHIL # BLD: 0.09 K/UL (ref 0–0.4)
EOSINOPHIL NFR BLD: 0.8 % (ref 0–7)
ERYTHROCYTE [DISTWIDTH] IN BLOOD BY AUTOMATED COUNT: 13 % (ref 11.5–14.5)
FLUAV RNA SPEC QL NAA+PROBE: NOT DETECTED
FLUBV RNA SPEC QL NAA+PROBE: NOT DETECTED
GLOBULIN SER CALC-MCNC: 5.3 G/DL (ref 2–4)
GLUCOSE SERPL-MCNC: 127 MG/DL (ref 65–100)
HCT VFR BLD AUTO: 37.1 % (ref 35–47)
HGB BLD-MCNC: 12.5 G/DL (ref 11.5–16)
IMM GRANULOCYTES # BLD AUTO: 0.11 K/UL (ref 0–0.04)
IMM GRANULOCYTES NFR BLD AUTO: 1 % (ref 0–0.5)
LYMPHOCYTES # BLD: 1 K/UL (ref 0.8–3.5)
LYMPHOCYTES NFR BLD: 8.7 % (ref 12–49)
MAGNESIUM SERPL-MCNC: 2.4 MG/DL (ref 1.6–2.4)
MCH RBC QN AUTO: 32.5 PG (ref 26–34)
MCHC RBC AUTO-ENTMCNC: 33.7 G/DL (ref 30–36.5)
MCV RBC AUTO: 96.4 FL (ref 80–99)
MONOCYTES # BLD: 1.06 K/UL (ref 0–1)
MONOCYTES NFR BLD: 9.2 % (ref 5–13)
NEUTS SEG # BLD: 9.24 K/UL (ref 1.8–8)
NEUTS SEG NFR BLD: 80 % (ref 32–75)
NRBC # BLD: 0 K/UL (ref 0–0.01)
NRBC BLD-RTO: 0 PER 100 WBC
PLATELET # BLD AUTO: 300 K/UL (ref 150–400)
PMV BLD AUTO: 10.5 FL (ref 8.9–12.9)
POTASSIUM SERPL-SCNC: 3.6 MMOL/L (ref 3.5–5.1)
PROCALCITONIN SERPL-MCNC: 0.19 NG/ML
PROT SERPL-MCNC: 8 G/DL (ref 6.4–8.2)
RBC # BLD AUTO: 3.85 M/UL (ref 3.8–5.2)
SARS-COV-2 RNA RESP QL NAA+PROBE: NOT DETECTED
SODIUM SERPL-SCNC: 137 MMOL/L (ref 136–145)
SOURCE: NORMAL
WBC # BLD AUTO: 11.5 K/UL (ref 3.6–11)

## 2025-05-25 PROCEDURE — 83735 ASSAY OF MAGNESIUM: CPT

## 2025-05-25 PROCEDURE — 96375 TX/PRO/DX INJ NEW DRUG ADDON: CPT

## 2025-05-25 PROCEDURE — 6360000002 HC RX W HCPCS: Performed by: EMERGENCY MEDICINE

## 2025-05-25 PROCEDURE — 80053 COMPREHEN METABOLIC PANEL: CPT

## 2025-05-25 PROCEDURE — 94761 N-INVAS EAR/PLS OXIMETRY MLT: CPT

## 2025-05-25 PROCEDURE — 84145 PROCALCITONIN (PCT): CPT

## 2025-05-25 PROCEDURE — 96374 THER/PROPH/DIAG INJ IV PUSH: CPT

## 2025-05-25 PROCEDURE — 6370000000 HC RX 637 (ALT 250 FOR IP): Performed by: EMERGENCY MEDICINE

## 2025-05-25 PROCEDURE — 2500000003 HC RX 250 WO HCPCS: Performed by: EMERGENCY MEDICINE

## 2025-05-25 PROCEDURE — 2580000003 HC RX 258: Performed by: EMERGENCY MEDICINE

## 2025-05-25 PROCEDURE — 99284 EMERGENCY DEPT VISIT MOD MDM: CPT

## 2025-05-25 PROCEDURE — 71045 X-RAY EXAM CHEST 1 VIEW: CPT

## 2025-05-25 PROCEDURE — 36415 COLL VENOUS BLD VENIPUNCTURE: CPT

## 2025-05-25 PROCEDURE — 85025 COMPLETE CBC W/AUTO DIFF WBC: CPT

## 2025-05-25 PROCEDURE — 87636 SARSCOV2 & INF A&B AMP PRB: CPT

## 2025-05-25 PROCEDURE — 70450 CT HEAD/BRAIN W/O DYE: CPT

## 2025-05-25 RX ORDER — AMOXICILLIN 875 MG/1
875 TABLET, COATED ORAL 2 TIMES DAILY
Qty: 20 TABLET | Refills: 0 | Status: SHIPPED | OUTPATIENT
Start: 2025-05-25 | End: 2025-06-04

## 2025-05-25 RX ORDER — GABAPENTIN 100 MG/1
100 CAPSULE ORAL
Status: COMPLETED | OUTPATIENT
Start: 2025-05-25 | End: 2025-05-25

## 2025-05-25 RX ORDER — ACETAMINOPHEN 500 MG
1000 TABLET ORAL
Status: COMPLETED | OUTPATIENT
Start: 2025-05-25 | End: 2025-05-25

## 2025-05-25 RX ORDER — AZITHROMYCIN 250 MG/1
250 TABLET, FILM COATED ORAL DAILY
Qty: 4 TABLET | Refills: 0 | Status: SHIPPED | OUTPATIENT
Start: 2025-05-25 | End: 2025-05-29

## 2025-05-25 RX ORDER — GABAPENTIN 100 MG/1
100 CAPSULE ORAL 3 TIMES DAILY
Qty: 21 CAPSULE | Refills: 0 | Status: SHIPPED | OUTPATIENT
Start: 2025-05-25 | End: 2025-06-01

## 2025-05-25 RX ORDER — METHOCARBAMOL 500 MG/1
1000 TABLET, FILM COATED ORAL
Status: COMPLETED | OUTPATIENT
Start: 2025-05-25 | End: 2025-05-25

## 2025-05-25 RX ORDER — AMOXICILLIN 250 MG/1
1000 CAPSULE ORAL
Status: COMPLETED | OUTPATIENT
Start: 2025-05-25 | End: 2025-05-25

## 2025-05-25 RX ORDER — 0.9 % SODIUM CHLORIDE 0.9 %
1000 INTRAVENOUS SOLUTION INTRAVENOUS ONCE
Status: COMPLETED | OUTPATIENT
Start: 2025-05-25 | End: 2025-05-25

## 2025-05-25 RX ORDER — KETOROLAC TROMETHAMINE 10 MG/1
10 TABLET, FILM COATED ORAL EVERY 6 HOURS PRN
Qty: 20 TABLET | Refills: 0 | Status: SHIPPED | OUTPATIENT
Start: 2025-05-25 | End: 2025-05-30

## 2025-05-25 RX ORDER — AZITHROMYCIN 250 MG/1
500 TABLET, FILM COATED ORAL DAILY
Status: DISCONTINUED | OUTPATIENT
Start: 2025-05-25 | End: 2025-05-25 | Stop reason: HOSPADM

## 2025-05-25 RX ORDER — METHOCARBAMOL 750 MG/1
750 TABLET, FILM COATED ORAL 4 TIMES DAILY
Qty: 28 TABLET | Refills: 0 | Status: SHIPPED | OUTPATIENT
Start: 2025-05-25 | End: 2025-06-01

## 2025-05-25 RX ORDER — KETOROLAC TROMETHAMINE 15 MG/ML
15 INJECTION, SOLUTION INTRAMUSCULAR; INTRAVENOUS ONCE
Status: COMPLETED | OUTPATIENT
Start: 2025-05-25 | End: 2025-05-25

## 2025-05-25 RX ORDER — PREDNISONE 50 MG/1
50 TABLET ORAL DAILY
Qty: 5 TABLET | Refills: 0 | Status: SHIPPED | OUTPATIENT
Start: 2025-05-25 | End: 2025-05-30

## 2025-05-25 RX ADMIN — GABAPENTIN 100 MG: 100 CAPSULE ORAL at 16:13

## 2025-05-25 RX ADMIN — METHYLPREDNISOLONE SODIUM SUCCINATE 125 MG: 125 INJECTION INTRAMUSCULAR; INTRAVENOUS at 16:14

## 2025-05-25 RX ADMIN — METHOCARBAMOL TABLETS 1000 MG: 500 TABLET, COATED ORAL at 14:48

## 2025-05-25 RX ADMIN — KETOROLAC TROMETHAMINE 15 MG: 15 INJECTION, SOLUTION INTRAMUSCULAR; INTRAVENOUS at 14:49

## 2025-05-25 RX ADMIN — AMOXICILLIN 1000 MG: 250 CAPSULE ORAL at 17:53

## 2025-05-25 RX ADMIN — ACETAMINOPHEN 1000 MG: 500 TABLET ORAL at 14:47

## 2025-05-25 RX ADMIN — AZITHROMYCIN DIHYDRATE 500 MG: 250 TABLET ORAL at 17:53

## 2025-05-25 RX ADMIN — SODIUM CHLORIDE 1000 ML: 0.9 INJECTION, SOLUTION INTRAVENOUS at 14:51

## 2025-05-25 ASSESSMENT — PAIN DESCRIPTION - LOCATION
LOCATION: HEAD

## 2025-05-25 ASSESSMENT — PAIN SCALES - GENERAL
PAINLEVEL_OUTOF10: 4
PAINLEVEL_OUTOF10: 8

## 2025-05-25 ASSESSMENT — PAIN DESCRIPTION - DESCRIPTORS
DESCRIPTORS: ACHING

## 2025-05-25 ASSESSMENT — PAIN DESCRIPTION - ORIENTATION
ORIENTATION: POSTERIOR

## 2025-05-25 ASSESSMENT — PAIN - FUNCTIONAL ASSESSMENT: PAIN_FUNCTIONAL_ASSESSMENT: 0-10

## 2025-05-25 NOTE — ED TRIAGE NOTES
Pt arrives to the ER for complaint of cough/congestion that started on 5/13.     Pt states that a few days ago, she began to have an and intense headache. Reports taking tylenol with no relief.     Pt reports that she had two episodes of night sweats within the last week. Reports that she may have had a fever.     Denies any diarrhea, vomiting

## 2025-05-25 NOTE — ED PROVIDER NOTES
All other components within normal limits   COMPREHENSIVE METABOLIC PANEL - Abnormal; Notable for the following components:    Glucose 127 (*)     BUN/Creatinine Ratio 9 (*)     Albumin 2.7 (*)     Globulin 5.3 (*)     Albumin/Globulin Ratio 0.5 (*)     All other components within normal limits   COVID-19 & INFLUENZA COMBO   MAGNESIUM   PROCALCITONIN       All other labs were within normal range or not returned as of this dictation.    EMERGENCY DEPARTMENT COURSE and DIFFERENTIAL DIAGNOSIS/MDM:   Vitals:    Vitals:    05/25/25 1600 05/25/25 1615 05/25/25 1645 05/25/25 1700   BP: 115/65 115/65 110/64 113/64   Pulse: 90   80   Resp: 16   18   Temp: 99.7 °F (37.6 °C)   99.1 °F (37.3 °C)   TempSrc: Oral   Oral   SpO2: 94% 95% 93% 94%   Weight:       Height:               Medical Decision Making  Assessment: Patient presenting with several days of febrile illness, cough, chest congestion.  Arrives febrile with elevated blood pressure.  She is having spasms of pain rating up her neck into the back of her head.  No meningeal signs.  I am not concerned about meningitis.  White count was slightly elevated 11.5.  Electrolytes were normal.  Kidney function was normal.  Flu and COVID were negative.  Procalcitonin was also reassuring.  Head CT showed evidence of fluid in the maxillary sinuses suspicious for acute sinusitis.  I think her symptoms are more suggestive of a viral illness given her fever and cough.  She may also have a concurrent sinusitis but no think this explains the neck and head pain that she is experiencing.  I think is most likely musculoskeletal.  Possibly a pinched nerve or herniated disc as a result of her coughing.  No focal neurologic deficits to suggest acute cord compression.  Patient is resting more comfortably after medications.  States her symptoms are have improved by 90%.  Chest x-ray showing bilateral lower lobe airspace disease consistent with pneumonia.  I think the patient stable for discharge

## 2025-05-30 ENCOUNTER — TELEPHONE (OUTPATIENT)
Age: 65
End: 2025-05-30

## 2025-05-30 NOTE — TELEPHONE ENCOUNTER
If pt calls back please inform her unfortunately Dr. GOODE is not accepting new pts. Pt should seek an UC for her Pneumonia. Please offer new pt appt with another PCP that is accepting new pts.-TM 5/30/25.

## 2025-05-30 NOTE — TELEPHONE ENCOUNTER
----- Message from Alma WONG sent at 5/29/2025  2:21 PM EDT -----  Regarding: [facility] Appointment Request  [facility] Appointment Request    Patient needs appointment for [facility] Appointment Type: New Patient.    Patient Requested Dates(s): As soon as possible or 2nd week of June 2025  Patient Requested Time: Early afternoon   Provider Name: Viviana Fuentes MD    Reason for Appointment Request: New Patient - Requested Provider unavailable  --------------------------------------------------------------------------------------------------------------------------    Relationship to Patient: Self     Call Back Information: OK to leave message on voicemail  Preferred Call Back Number: Phone [phone]    Note: Patient is new, requesting to schedule an appointment with Dr. Viviana Fuentes MD to establish care and patient has a Pneumonia.

## 2025-06-06 ENCOUNTER — HOSPITAL ENCOUNTER (OUTPATIENT)
Age: 65
Discharge: HOME | End: 2025-06-09
Payer: MEDICARE

## 2025-06-06 DIAGNOSIS — J18.9 PNEUMONIA DUE TO INFECTIOUS ORGANISM, UNSPECIFIED LATERALITY, UNSPECIFIED PART OF LUNG: ICD-10-CM

## 2025-06-06 PROCEDURE — 71046 X-RAY EXAM CHEST 2 VIEWS: CPT

## 2025-07-03 ENCOUNTER — HOSPITAL ENCOUNTER (OUTPATIENT)
Facility: HOSPITAL | Age: 65
Discharge: HOME | End: 2025-07-03
Payer: MEDICARE

## 2025-07-03 VITALS — WEIGHT: 133 LBS | BODY MASS INDEX: 22.83 KG/M2

## 2025-07-03 DIAGNOSIS — J18.9 PNEUMONIA DUE TO INFECTIOUS ORGANISM, UNSPECIFIED LATERALITY, UNSPECIFIED PART OF LUNG: ICD-10-CM

## 2025-07-03 DIAGNOSIS — Z12.31 OTHER SCREENING MAMMOGRAM: ICD-10-CM

## 2025-07-03 PROCEDURE — 77067 SCR MAMMO BI INCL CAD: CPT

## 2025-07-03 PROCEDURE — 77063 BREAST TOMOSYNTHESIS BI: CPT

## 2025-07-03 PROCEDURE — 71046 X-RAY EXAM CHEST 2 VIEWS: CPT
